# Patient Record
Sex: MALE | Race: WHITE | NOT HISPANIC OR LATINO | Employment: FULL TIME | URBAN - NONMETROPOLITAN AREA
[De-identification: names, ages, dates, MRNs, and addresses within clinical notes are randomized per-mention and may not be internally consistent; named-entity substitution may affect disease eponyms.]

---

## 2017-01-19 ENCOUNTER — APPOINTMENT (OUTPATIENT)
Dept: MEDICAL GROUP | Facility: PHYSICIAN GROUP | Age: 42
End: 2017-01-19
Payer: COMMERCIAL

## 2017-02-15 DIAGNOSIS — M54.9 TRIGGER POINT WITH BACK PAIN: ICD-10-CM

## 2017-02-15 DIAGNOSIS — M54.50 ACUTE LEFT-SIDED LOW BACK PAIN WITHOUT SCIATICA: ICD-10-CM

## 2017-02-15 NOTE — TELEPHONE ENCOUNTER
Was the patient seen in the last year in this department? Yes     Does patient have an active prescription for medications requested? Yes     Received Request Via: Patient     Called patient to reschedule his apt. From 2/23/17 to 4/28/17. Pt. Will be out of Tramadol.

## 2017-02-16 RX ORDER — TRAMADOL HYDROCHLORIDE 50 MG/1
50-100 TABLET ORAL EVERY 4 HOURS PRN
Qty: 30 TAB | Refills: 0 | Status: SHIPPED | OUTPATIENT
Start: 2017-02-16 | End: 2017-04-28 | Stop reason: SDUPTHER

## 2017-02-16 NOTE — TELEPHONE ENCOUNTER
Notify patient refill on the tramadol at the office.    Please clarify with him this is a schedule II medication, cannot be filled outside of an OV.  He needs to come  this rx and take it to his pharmacy.  It is important for him to keep the appointment with Dr Abraham.

## 2017-04-28 ENCOUNTER — OFFICE VISIT (OUTPATIENT)
Dept: MEDICAL GROUP | Facility: PHYSICIAN GROUP | Age: 42
End: 2017-04-28
Payer: COMMERCIAL

## 2017-04-28 VITALS
WEIGHT: 186 LBS | OXYGEN SATURATION: 94 % | TEMPERATURE: 98.5 F | HEART RATE: 74 BPM | BODY MASS INDEX: 26.63 KG/M2 | DIASTOLIC BLOOD PRESSURE: 78 MMHG | SYSTOLIC BLOOD PRESSURE: 130 MMHG | RESPIRATION RATE: 12 BRPM | HEIGHT: 70 IN

## 2017-04-28 DIAGNOSIS — Z79.899 CONTROLLED SUBSTANCE AGREEMENT SIGNED: ICD-10-CM

## 2017-04-28 DIAGNOSIS — M54.50 CHRONIC MIDLINE LOW BACK PAIN WITHOUT SCIATICA: ICD-10-CM

## 2017-04-28 DIAGNOSIS — G89.29 CHRONIC MIDLINE LOW BACK PAIN WITHOUT SCIATICA: ICD-10-CM

## 2017-04-28 PROCEDURE — 99213 OFFICE O/P EST LOW 20 MIN: CPT | Performed by: INTERNAL MEDICINE

## 2017-04-28 RX ORDER — DICLOFENAC SODIUM 75 MG/1
75 TABLET, DELAYED RELEASE ORAL 2 TIMES DAILY
Qty: 180 TAB | Refills: 3 | Status: SHIPPED | OUTPATIENT
Start: 2017-04-28 | End: 2018-05-06 | Stop reason: SDUPTHER

## 2017-04-28 RX ORDER — TRAMADOL HYDROCHLORIDE 50 MG/1
TABLET ORAL
Qty: 30 TAB | Refills: 0 | Status: SHIPPED | OUTPATIENT
Start: 2017-04-28 | End: 2017-04-28 | Stop reason: SDUPTHER

## 2017-04-28 RX ORDER — TRAMADOL HYDROCHLORIDE 50 MG/1
TABLET ORAL
Qty: 30 TAB | Refills: 0 | Status: SHIPPED | OUTPATIENT
Start: 2017-04-28 | End: 2017-07-05 | Stop reason: SDUPTHER

## 2017-04-28 ASSESSMENT — PAIN SCALES - GENERAL: PAINLEVEL: 3=SLIGHT PAIN

## 2017-04-28 NOTE — PROGRESS NOTES
Chief Complaint   Patient presents with   • Medication Refill     tramadol   • Follow-Up     back pain       HISTORY OF PRESENT ILLNESS: Patient is a 41 y.o. male established patient who presents today to discuss the medical issues below.    Lumbar back pain  Doing ok with still rare tramadol, he has modified lifting.  He states he has 6-10 tabs from last rx.  He is using flexaril rarely.  States not using the diclofenac using ibuprofen 200 mg 5 tabs bid as he ran out of the diclofenac.  Pain at low back no radiation, no lower extremity numbness tingling or weakness. 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    Controlled substance agreement signed  Patient has executed pain contract.  On review of the chart the incorrect code had been entered as violation of contract, however, patient has not had any violations.        Patient Active Problem List    Diagnosis Date Noted   • Controlled substance agreement signed 12/20/2016   • Anxiety 11/23/2016   • Lumbar back pain 06/10/2015       Allergies:Bee venom    Current Outpatient Prescriptions   Medication Sig Dispense Refill   • diclofenac EC (VOLTAREN) 75 MG Tablet Delayed Response Take 1 Tab by mouth 2 times a day. 180 Tab 3   • tramadol (ULTRAM) 50 MG Tab 1 bid prn pain, not more than #30 in 30 day period 30 Tab 0   • cyclobenzaprine (FLEXERIL) 10 MG Tab Take 1 Tab by mouth at bedtime as needed. 30 Tab 2   • ibuprofen (MOTRIN) 200 MG Tab Take 200 mg by mouth every 6 hours as needed.     • Diclofenac Sodium (VOLTAREN) 1 % Gel Apply 1 Units to skin as directed 2 times a day as needed. 1 Tube 0   • sulfamethoxazole-trimethoprim (BACTRIM DS) 800-160 MG tablet Take 1 Tab by mouth 2 times a day. (Patient not taking: Reported on 4/28/2017) 20 Tab 0     No current facility-administered medications for this visit.         Past Medical History   Diagnosis Date   • Lumbar back pain 6/10/2015   • Anxiety 11/23/2016   • Hangnail 12/20/2016   •  "Controlled substance agreement broken 12/20/2016       Social History   Substance Use Topics   • Smoking status: Former Smoker -- 0.25 packs/day for 15 years     Types: Cigarettes   • Smokeless tobacco: Never Used      Comment: Quit 2 years ago   • Alcohol Use: No       No family status information on file.     Family History   Problem Relation Age of Onset   • Adopted: Yes       ROS:    Respiratory: Negative for cough, sputum production, shortness of breath or wheezing.    Cardiovascular: Negative for chest pain, palpitations, orthopnea, dyspnea with exertion or edema.   Gastrointestinal: Negative for GI upset, nausea, vomiting, abdominal pain, constipation or diarrhea.   Genitourinary: Negative for dysuria, urgency, hesitancy or frequency.       Exam:    Blood pressure 130/78, pulse 74, temperature 36.9 °C (98.5 °F), resp. rate 12, height 1.778 m (5' 10\"), weight 84.369 kg (186 lb), SpO2 94 %.  General:  Well nourished, well developed male in NAD.  Spine: No vertebral or vertebral angle tenderness negative straight leg testing  Pulmonary: Clear to ausculation and percussion.  Normal effort. No rales, rhonchi, or wheezing.  Cardiovascular: Regular rate and rhythm without murmur.   Abdomen: Normal bowel sounds soft and nontender no palpable liver spleen bladder mass.  Extremities: No LE edema noted.  Neuro: Grossly nonfocal. No lower extremity weakness  Psych: Alert and oriented to person, place, and time. Appropriate mood and conversation.        This dictation was created using voice recognition software. I have made reasonable attempts to correct errors, however, errors of grammar and content may exist.          Assessment/Plan:    1. Chronic midline low back pain without sciatica  Medication refill for the tramadol. Reviewed at length. Patient states that the Voltaren as frequently as effective as the tramadol but he has been out of that for a while now. Refills on diclofenac, stretching exercises, refill on " tramadol minimal dosage. Discussed if not significantly improving with exercise will need referral back to pain specialist for management.  - diclofenac EC (VOLTAREN) 75 MG Tablet Delayed Response; Take 1 Tab by mouth 2 times a day.  Dispense: 180 Tab; Refill: 3  - tramadol (ULTRAM) 50 MG Tab; 1 bid prn pain, not more than #30 in 30 day period  Dispense: 30 Tab; Refill: 0    2. Controlled substance agreement signed  This was incorrectly documented previously as noncompliance the records are corrected..    Patient was seen for 20 minutes face to face of which more than 50% of the time was spent in counseling and coordination of care regarding the above problems.

## 2017-04-28 NOTE — MR AVS SNAPSHOT
"        Skip Bernard   2017 11:20 AM   Office Visit   MRN: [id number]    Department:  [facility]   Dept Phone:  [phone]    Description:  Male : 1975   Provider:  Jessica MAURICE M.D.           Reason for Visit     Medication Refill tramadol    Follow-Up back pain      Allergies as of 2017     Allergen Noted Reactions    Bee Venom 10/19/2012         You were diagnosed with     Acute left-sided low back pain without sciatica   [9551363]   Acute on chronic problem. Likely secondary to lifting and bending. No bony tenderness. Likely muscular. Start tramadol, prednisone, Robaxin. Follow-up PCP    Trigger point with back pain   [[id number]]   Tender, palpable trigger point of the left lumbar region which was injected with lidocaine. Follow-up with PCP. Given written instructions.    Controlled substance agreement signed   [[id number]]         Vital Signs     Blood Pressure Pulse Temperature Respirations Height Weight    130/78 mmHg 74 36.9 °C (98.5 °F) 12 1.778 m (5' 10\") 84.369 kg (186 lb)    Body Mass Index Oxygen Saturation Smoking Status             26.69 kg/m2 94% Former Smoker         Basic Information     Date Of Birth Sex Race Ethnicity Preferred Language    1975 Male White Non- English      Problem List              ICD-10-CM Priority Class Noted - Resolved    Lumbar back pain M54.5   6/10/2015 - Present    Anxiety F41.9   2016 - Present    Controlled substance agreement signed Z79.899   2016 - Present      Health Maintenance        Date Due Completion Dates    IMM DTaP/Tdap/Td Vaccine (1 - Tdap) 11/15/1996 ---            Current Immunizations     No immunizations on file.      Below and/or attached are the medications your provider expects you to take. Review all of your home medications and newly ordered medications with your provider and/or pharmacist. Follow medication instructions as directed by your provider and/or pharmacist. Please keep your medication " list with you and share with your provider. Update the information when medications are discontinued, doses are changed, or new medications (including over-the-counter products) are added; and carry medication information at all times in the event of emergency situations     Allergies:  BEE VENOM - (reactions not documented)               Medications  Valid as of: April 28, 2017 - 11:46 AM    Generic Name Brand Name Tablet Size Instructions for use    Cyclobenzaprine HCl (Tab) FLEXERIL 10 MG Take 1 Tab by mouth at bedtime as needed.        Diclofenac Sodium (Gel) Diclofenac Sodium 1 % Apply 1 Units to skin as directed 2 times a day as needed.        Diclofenac Sodium (Tablet Delayed Response) VOLTAREN 75 MG Take 1 Tab by mouth 2 times a day.        Ibuprofen (Tab) MOTRIN 200 MG Take 200 mg by mouth every 6 hours as needed.        Sulfamethoxazole-Trimethoprim (Tab) BACTRIM -160 MG Take 1 Tab by mouth 2 times a day.        TraMADol HCl (Tab) ULTRAM 50 MG 1 bid prn pain, not more than #30 in 30 day period        .                 Medicines prescribed today were sent to:     [facility] [place] - [address] AT [place] [address]    [address] [place] [address]    Phone: [phone] Fax: [phone]    Open 24 Hours?: No      Medication refill instructions:       If your prescription bottle indicates you have medication refills left, it is not necessary to call your provider’s office. Please contact your pharmacy and they will refill your medication.    If your prescription bottle indicates you do not have any refills left, you may request refills at any time through one of the following ways: The online Blockchain system (except Urgent Care), by calling your provider’s office, or by asking your pharmacy to contact your provider’s office with a refill request. Medication refills are processed only during regular business hours and may not be available until the next business day.  Your provider may request additional information or to have a follow-up visit with you prior to refilling your medication.   *Please Note: Medication refills are assigned a new Rx number when refilled electronically. Your pharmacy may indicate that no refills were authorized even though a new prescription for the same medication is available at the pharmacy. Please request the medicine by name with the pharmacy before contacting your provider for a refill.           Lyst[employer] Access Code: Activation code not generated  Current Lysthart Status: Active

## 2017-04-28 NOTE — ASSESSMENT & PLAN NOTE
Patient has executed pain contract.  On review of the chart the incorrect code had been entered as violation of contract, however, patient has not had any violations.

## 2017-04-28 NOTE — ASSESSMENT & PLAN NOTE
Doing ok with still rare tramadol, he has modified lifting.  He states he has 6-10 tabs from last rx.  He is using flexaril rarely.  States not using the diclofenac using ibuprofen 200 mg 5 tabs bid as he ran out of the diclofenac.  Pain at low back no radiation, no lower extremity numbness tingling or weakness.

## 2017-07-05 ENCOUNTER — OFFICE VISIT (OUTPATIENT)
Dept: MEDICAL GROUP | Facility: PHYSICIAN GROUP | Age: 42
End: 2017-07-05
Payer: COMMERCIAL

## 2017-07-05 VITALS
HEART RATE: 76 BPM | WEIGHT: 189 LBS | SYSTOLIC BLOOD PRESSURE: 122 MMHG | OXYGEN SATURATION: 95 % | BODY MASS INDEX: 27.99 KG/M2 | HEIGHT: 69 IN | DIASTOLIC BLOOD PRESSURE: 84 MMHG | RESPIRATION RATE: 16 BRPM | TEMPERATURE: 98.2 F

## 2017-07-05 DIAGNOSIS — F41.9 ANXIETY: ICD-10-CM

## 2017-07-05 DIAGNOSIS — M54.50 CHRONIC MIDLINE LOW BACK PAIN WITHOUT SCIATICA: ICD-10-CM

## 2017-07-05 DIAGNOSIS — G89.29 CHRONIC MIDLINE LOW BACK PAIN WITHOUT SCIATICA: ICD-10-CM

## 2017-07-05 PROCEDURE — 99213 OFFICE O/P EST LOW 20 MIN: CPT | Performed by: INTERNAL MEDICINE

## 2017-07-05 RX ORDER — BACLOFEN 10 MG/1
10 TABLET ORAL NIGHTLY PRN
Qty: 30 TAB | Refills: 1 | Status: SHIPPED | OUTPATIENT
Start: 2017-07-05 | End: 2017-09-05 | Stop reason: SDUPTHER

## 2017-07-05 RX ORDER — TRAMADOL HYDROCHLORIDE 50 MG/1
TABLET ORAL
Qty: 30 TAB | Refills: 0 | Status: SHIPPED | OUTPATIENT
Start: 2017-07-05 | End: 2018-01-12

## 2017-07-05 ASSESSMENT — PAIN SCALES - GENERAL: PAINLEVEL: 3=SLIGHT PAIN

## 2017-07-05 NOTE — PROGRESS NOTES
Chief Complaint   Patient presents with   • Medication Refill     tramadol, diclofenac gel        HISTORY OF PRESENT ILLNESS: Patient is a 41 y.o. male established patient who presents today to discuss the medical issues below.    Lumbar back pain  Using the diclofenac.  Feels it works better.  Not really following the back exercises.  He is walking and stretching.  Pain at the low back.  Has had MRI in 2015 L4-5 degenerative disc.  Pain at the low back no radiation but does involve the right and the left.  Has had radiation into the left leg but not recent with better back mechanics.  No weakness.  Has used about 30 tramadol in 3 mos.  Flexaril gave him hang over sleepy so stopped.  Not really doing the back exercises (we did not give the back book).     Anxiety  Patient develops anxiety attacks when he considers needle injections on his back.  Patient does better with some vacation.  Doing more activities as shooting.  Denies any recent anxiety attacks.      Patient Active Problem List    Diagnosis Date Noted   • Controlled substance agreement signed 12/20/2016   • Anxiety 11/23/2016   • Lumbar back pain 06/10/2015       Allergies:Bee venom    Current Outpatient Prescriptions   Medication Sig Dispense Refill   • baclofen (LIORESAL) 10 MG Tab Take 1 Tab by mouth at bedtime as needed (back pain or spasm). 30 Tab 1   • tramadol (ULTRAM) 50 MG Tab 1 bid prn pain, not more than #30 in 30 day period 30 Tab 0   • diclofenac EC (VOLTAREN) 75 MG Tablet Delayed Response Take 1 Tab by mouth 2 times a day. 180 Tab 3   • ibuprofen (MOTRIN) 200 MG Tab Take 200 mg by mouth every 6 hours as needed.     • Diclofenac Sodium (VOLTAREN) 1 % Gel Apply 1 Units to skin as directed 2 times a day as needed. 1 Tube 0     No current facility-administered medications for this visit.         Past Medical History   Diagnosis Date   • Lumbar back pain 6/10/2015   • Anxiety 11/23/2016   • Hangnail 12/20/2016   • Controlled substance agreement  "broken 12/20/2016       Social History   Substance Use Topics   • Smoking status: Former Smoker -- 0.25 packs/day for 15 years     Types: Cigarettes   • Smokeless tobacco: Never Used      Comment: Quit 2 years ago   • Alcohol Use: No       No family status information on file.     Family History   Problem Relation Age of Onset   • Adopted: Yes       ROS:    Respiratory: Negative for cough, sputum production, shortness of breath or wheezing.    Cardiovascular: Negative for chest pain, palpitations, orthopnea, dyspnea with exertion or edema.   Gastrointestinal: Negative for GI upset, nausea, vomiting, abdominal pain, constipation or diarrhea.   Genitourinary: Negative for dysuria, urgency, hesitancy or frequency.       Exam:    Blood pressure 122/84, pulse 76, temperature 36.8 °C (98.2 °F), resp. rate 16, height 1.74 m (5' 8.5\"), weight 85.73 kg (189 lb), SpO2 95 %.  General:  Well nourished, well developed male in NAD.  Spine: No vertebral or vertebral angle tenderness no SI tenderness negative straight leg testing.  Cardiovascular: Regular rate and rhythm without murmur.   Abdomen: Normal bowel sounds soft and nontender no palpable liver spleen bladder mass.  Extremities: No LE edema noted.  Neuro: Grossly nonfocal.  Psych: Alert and oriented to person, place, and time. Appropriate mood and conversation.        This dictation was created using voice recognition software. I have made reasonable attempts to correct errors, however, errors of grammar and content may exist.          Assessment/Plan:    1. Chronic midline low back pain without sciatica  At baseline. Patient declines referral for consideration of injection pain management. He is utilizing tramadol about 30 tablets in a 90 day period of time. Doing fairly well with ongoing diclofenac, no GI upset, he is not doing stretching exercises. Back booklet is given. MRI reviewed.  - tramadol (ULTRAM) 50 MG Tab; 1 bid prn pain, not more than #30 in 30 day period  " Dispense: 30 Tab; Refill: 0  Refill on tramadol gel.    2. Anxiety  States he is doing fairly well at work, behavior modification is being effective. Currently on no additional medications. Monitor support.       Patient was seen for  20 minutes face to face of which more than 50% of the time was spent in counseling and coordination of care regarding the above problems.

## 2017-07-05 NOTE — MR AVS SNAPSHOT
"        Skip Bernard   2017 7:30 AM   Office Visit   MRN: [id number]    Department:  [facility]   Dept Phone:  [phone]    Description:  Male : 1975   Provider:  Jessica MAURICE M.D.           Reason for Visit     Medication Refill tramadol, diclofenac gel       Allergies as of 2017     Allergen Noted Reactions    Bee Venom 10/19/2012         You were diagnosed with     Chronic midline low back pain without sciatica   [6845086]       Anxiety   [[id number]]         Vital Signs     Blood Pressure Pulse Temperature Respirations Height Weight    122/84 mmHg 76 36.8 °C (98.2 °F) 16 1.74 m (5' 8.5\") 85.73 kg (189 lb)    Body Mass Index Oxygen Saturation Smoking Status             28.32 kg/m2 95% Former Smoker         Basic Information     Date Of Birth Sex Race Ethnicity Preferred Language    1975 Male White Non- English      Your appointments     Sep 27, 2017  3:30 PM   Established Patient with Jessica MAURICE M.D.   [facility] (--)    [address] [facility] [address] [phone]           You will be receiving a confirmation call a few days before your appointment from our automated call confirmation system.              Problem List              ICD-10-CM Priority Class Noted - Resolved    Lumbar back pain M54.5   6/10/2015 - Present    Anxiety F41.9   2016 - Present    Controlled substance agreement signed Z79.899   2016 - Present      Health Maintenance        Date Due Completion Dates    IMM DTaP/Tdap/Td Vaccine (1 - Tdap) 11/15/1996 ---    IMM INFLUENZA (1) 2017 ---            Current Immunizations     No immunizations on file.      Below and/or attached are the medications your provider expects you to take. Review all of your home medications and newly ordered medications with your provider and/or pharmacist. Follow medication instructions as directed by your provider and/or pharmacist. Please keep your medication list " with you and share with your provider. Update the information when medications are discontinued, doses are changed, or new medications (including over-the-counter products) are added; and carry medication information at all times in the event of emergency situations     Allergies:  BEE VENOM - (reactions not documented)               Medications  Valid as of: July 05, 2017 -  7:51 AM    Generic Name Brand Name Tablet Size Instructions for use    Baclofen (Tab) LIORESAL 10 MG Take 1 Tab by mouth at bedtime as needed (back pain or spasm).        Diclofenac Sodium (Gel) Diclofenac Sodium 1 % Apply 1 Units to skin as directed 2 times a day as needed.        Diclofenac Sodium (Tablet Delayed Response) VOLTAREN 75 MG Take 1 Tab by mouth 2 times a day.        Ibuprofen (Tab) MOTRIN 200 MG Take 200 mg by mouth every 6 hours as needed.        TraMADol HCl (Tab) ULTRAM 50 MG 1 bid prn pain, not more than #30 in 30 day period        .                 Medicines prescribed today were sent to:     [facility] [address] [facility] [place] - [address] AT Pending sale to [facility]     [address] [facility] [address]    Phone: [phone] Fax: [phone]    Open 24 Hours?: No      Medication refill instructions:       If your prescription bottle indicates you have medication refills left, it is not necessary to call your provider’s office. Please contact your pharmacy and they will refill your medication.    If your prescription bottle indicates you do not have any refills left, you may request refills at any time through one of the following ways: The online [employer] system (except Urgent Care), by calling your provider’s office, or by asking your pharmacy to contact your provider’s office with a refill request. Medication refills are processed only during regular business hours and may not be available until the next business day. Your provider may request additional information or to have a follow-up visit with you prior to  refilling your medication.   *Please Note: Medication refills are assigned a new Rx number when refilled electronically. Your pharmacy may indicate that no refills were authorized even though a new prescription for the same medication is available at the pharmacy. Please request the medicine by name with the pharmacy before contacting your provider for a refill.           [employer] Access Code: Activation code not generated  Current [employer] Status: Active

## 2017-07-05 NOTE — ASSESSMENT & PLAN NOTE
Patient develops anxiety attacks when he considers needle injections on his back.  Patient does better with some vacation.  Doing more activities as shooting.  Denies any recent anxiety attacks.

## 2017-07-05 NOTE — ASSESSMENT & PLAN NOTE
Using the diclofenac.  Feels it works better.  Not really following the back exercises.  He is walking and stretching.  Pain at the low back.  Has had MRI in 2015 L4-5 degenerative disc.  Pain at the low back no radiation but does involve the right and the left.  Has had radiation into the left leg but not recent with better back mechanics.  No weakness.  Has used about 30 tramadol in 3 mos.  Flexaril gave him hang over sleepy so stopped.  Not really doing the back exercises (we did not give the back book).

## 2017-07-11 ENCOUNTER — OFFICE VISIT (OUTPATIENT)
Dept: URGENT CARE | Facility: PHYSICIAN GROUP | Age: 42
End: 2017-07-11
Payer: COMMERCIAL

## 2017-07-11 VITALS
TEMPERATURE: 98.4 F | DIASTOLIC BLOOD PRESSURE: 90 MMHG | BODY MASS INDEX: 25.48 KG/M2 | OXYGEN SATURATION: 97 % | RESPIRATION RATE: 20 BRPM | HEART RATE: 98 BPM | WEIGHT: 178 LBS | SYSTOLIC BLOOD PRESSURE: 102 MMHG | HEIGHT: 70 IN

## 2017-07-11 DIAGNOSIS — H69.93 EUSTACHIAN TUBE DYSFUNCTION, BILATERAL: ICD-10-CM

## 2017-07-11 DIAGNOSIS — F41.9 ANXIETY: ICD-10-CM

## 2017-07-11 PROCEDURE — 99214 OFFICE O/P EST MOD 30 MIN: CPT | Performed by: PHYSICIAN ASSISTANT

## 2017-07-11 RX ORDER — HYDROXYZINE HYDROCHLORIDE 25 MG/1
25 TABLET, FILM COATED ORAL 3 TIMES DAILY PRN
Qty: 30 TAB | Refills: 0 | Status: SHIPPED | OUTPATIENT
Start: 2017-07-11 | End: 2017-09-27 | Stop reason: SDUPTHER

## 2017-07-11 NOTE — MR AVS SNAPSHOT
"        Skip Marco Antonio   2017 10:55 AM   Office Visit   MRN: [id number]    Department:  [facility]   Dept Phone:  [phone]    Description:  Male : 1975   Provider:  Juan Espitia PA-C           Reason for Visit     Anxiety anxiety attack x1day ago tired, cant sleep, no appetite    Otalgia bilateral ear pain since this morning       Allergies as of 2017     Allergen Noted Reactions    Bee Venom 10/19/2012         You were diagnosed with     Anxiety   [699300]       Eustachian tube dysfunction, bilateral   [7491987]         Vital Signs     Blood Pressure Pulse Temperature Respirations Height Weight    102/90 mmHg 98 36.9 °C (98.4 °F) 20 1.778 m (5' 10\") 80.74 kg (178 lb)    Body Mass Index Oxygen Saturation Smoking Status             25.54 kg/m2 97% Former Smoker         Basic Information     Date Of Birth Sex Race Ethnicity Preferred Language    1975 Male White Non- English      Your appointments     Sep 27, 2017  3:30 PM   Established Patient with Jessica MAURICE M.D.   [facility] (--)    [address] [id number] [phone]           You will be receiving a confirmation call a few days before your appointment from our automated call confirmation system.              Problem List              ICD-10-CM Priority Class Noted - Resolved    Lumbar back pain M54.5   6/10/2015 - Present    Anxiety F41.9   2016 - Present    Controlled substance agreement signed Z79.899   2016 - Present      Health Maintenance        Date Due Completion Dates    IMM DTaP/Tdap/Td Vaccine (1 - Tdap) 11/15/1996 ---    IMM INFLUENZA (1) 2017 ---            Current Immunizations     No immunizations on file.      Below and/or attached are the medications your provider expects you to take. Review all of your home medications and newly ordered medications with your provider and/or pharmacist. Follow medication instructions as directed by your " provider and/or pharmacist. Please keep your medication list with you and share with your provider. Update the information when medications are discontinued, doses are changed, or new medications (including over-the-counter products) are added; and carry medication information at all times in the event of emergency situations     Allergies:  BEE VENOM - (reactions not documented)               Medications  Valid as of: July 11, 2017 - 11:42 AM    Generic Name Brand Name Tablet Size Instructions for use    Baclofen (Tab) LIORESAL 10 MG Take 1 Tab by mouth at bedtime as needed (back pain or spasm).        Diclofenac Sodium (Tablet Delayed Response) VOLTAREN 75 MG Take 1 Tab by mouth 2 times a day.        Diclofenac Sodium (Gel) Diclofenac Sodium 1 % Apply 1 Units to skin as directed 2 times a day as needed.        HydrOXYzine HCl (Tab) ATARAX 25 MG Take 1 Tab by mouth 3 times a day as needed for Itching.        TraMADol HCl (Tab) ULTRAM 50 MG 1 bid prn pain, not more than #30 in 30 day period        .                 Medicines prescribed today were sent to:     [id number] - [facility] [place] - [address] AT [place] [address]    [address] [place] [address]    Phone: [phone] Fax: [phone]    Open 24 Hours?: No      Medication refill instructions:       If your prescription bottle indicates you have medication refills left, it is not necessary to call your provider’s office. Please contact your pharmacy and they will refill your medication.    If your prescription bottle indicates you do not have any refills left, you may request refills at any time through one of the following ways: The online FRAMED system (except Urgent Care), by calling your provider’s office, or by asking your pharmacy to contact your provider’s office with a refill request. Medication refills are processed only during regular business hours and may not be available until the next business day. Your provider may  request additional information or to have a follow-up visit with you prior to refilling your medication.   *Please Note: Medication refills are assigned a new Rx number when refilled electronically. Your pharmacy may indicate that no refills were authorized even though a new prescription for the same medication is available at the pharmacy. Please request the medicine by name with the pharmacy before contacting your provider for a refill.           SafetyCertifiedhart Access Code: Activation code not generated  Current Xapot Status: Active
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
## 2017-09-27 ENCOUNTER — OFFICE VISIT (OUTPATIENT)
Dept: MEDICAL GROUP | Facility: PHYSICIAN GROUP | Age: 42
End: 2017-09-27
Payer: COMMERCIAL

## 2017-09-27 VITALS
DIASTOLIC BLOOD PRESSURE: 72 MMHG | WEIGHT: 177 LBS | SYSTOLIC BLOOD PRESSURE: 120 MMHG | HEART RATE: 87 BPM | OXYGEN SATURATION: 95 % | HEIGHT: 69 IN | RESPIRATION RATE: 16 BRPM | TEMPERATURE: 98.5 F | BODY MASS INDEX: 26.22 KG/M2

## 2017-09-27 DIAGNOSIS — F41.9 ANXIETY: ICD-10-CM

## 2017-09-27 DIAGNOSIS — G89.29 CHRONIC MIDLINE LOW BACK PAIN WITHOUT SCIATICA: ICD-10-CM

## 2017-09-27 DIAGNOSIS — M54.50 CHRONIC MIDLINE LOW BACK PAIN WITHOUT SCIATICA: ICD-10-CM

## 2017-09-27 PROCEDURE — 99214 OFFICE O/P EST MOD 30 MIN: CPT | Performed by: INTERNAL MEDICINE

## 2017-09-27 RX ORDER — VENLAFAXINE HYDROCHLORIDE 37.5 MG/1
37.5 CAPSULE, EXTENDED RELEASE ORAL DAILY
Qty: 30 CAP | Refills: 3 | Status: SHIPPED | OUTPATIENT
Start: 2017-09-27 | End: 2018-01-12

## 2017-09-27 RX ORDER — HYDROXYZINE HYDROCHLORIDE 25 MG/1
25 TABLET, FILM COATED ORAL NIGHTLY PRN
Qty: 30 TAB | Refills: 2 | Status: SHIPPED | OUTPATIENT
Start: 2017-09-27 | End: 2018-01-12 | Stop reason: SDUPTHER

## 2017-09-27 ASSESSMENT — PAIN SCALES - GENERAL: PAINLEVEL: 5=MODERATE PAIN
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
## 2017-09-27 NOTE — ASSESSMENT & PLAN NOTE
Patient reports he did not get into the [facility] [place] due to work schedule.  He reports he is using he has 10 left over from last rx.  Not doing regular stretching or back exercises.  He is using the voltaren.  Pain is always present 2-3/10 at the best, 10/10 when bad, low back on the right with some some radiation into both legs.  Better if stretches to the right.  Taking the diclofenac only prn, cream better.  He doesn't feel the diclfenac significantly changes the pain.  Patient admits that some norco for a dental extraction really helped the back.
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
## 2018-01-12 ENCOUNTER — OFFICE VISIT (OUTPATIENT)
Dept: MEDICAL GROUP | Facility: PHYSICIAN GROUP | Age: 43
End: 2018-01-12
Payer: COMMERCIAL

## 2018-01-12 VITALS
TEMPERATURE: 98.9 F | BODY MASS INDEX: 27.11 KG/M2 | HEART RATE: 86 BPM | OXYGEN SATURATION: 95 % | SYSTOLIC BLOOD PRESSURE: 110 MMHG | HEIGHT: 69 IN | DIASTOLIC BLOOD PRESSURE: 78 MMHG | RESPIRATION RATE: 16 BRPM | WEIGHT: 183 LBS

## 2018-01-12 DIAGNOSIS — F41.9 ANXIETY: ICD-10-CM

## 2018-01-12 DIAGNOSIS — M54.50 CHRONIC MIDLINE LOW BACK PAIN WITHOUT SCIATICA: ICD-10-CM

## 2018-01-12 DIAGNOSIS — G89.29 CHRONIC MIDLINE LOW BACK PAIN WITHOUT SCIATICA: ICD-10-CM

## 2018-01-12 PROCEDURE — 99214 OFFICE O/P EST MOD 30 MIN: CPT | Performed by: INTERNAL MEDICINE

## 2018-01-12 RX ORDER — HYDROXYZINE HYDROCHLORIDE 25 MG/1
25 TABLET, FILM COATED ORAL 2 TIMES DAILY PRN
Qty: 30 TAB | Refills: 3 | Status: SHIPPED | OUTPATIENT
Start: 2018-01-12 | End: 2018-04-13 | Stop reason: SDUPTHER

## 2018-01-12 RX ORDER — ESCITALOPRAM OXALATE 10 MG/1
10 TABLET ORAL DAILY
Qty: 30 TAB | Refills: 3 | Status: SHIPPED | OUTPATIENT
Start: 2018-01-12 | End: 2018-04-13

## 2018-01-12 ASSESSMENT — PATIENT HEALTH QUESTIONNAIRE - PHQ9: CLINICAL INTERPRETATION OF PHQ2 SCORE: 0

## 2018-01-13 NOTE — ASSESSMENT & PLAN NOTE
Patient reports he is now riding bikes, avoiding rock climbing and 4 wheelers.  Uses the diclofenac regularly and not using the tramadol at all.

## 2018-01-13 NOTE — ASSESSMENT & PLAN NOTE
Patient states the venlafaxine began to make him feel withdrawn from his family, the hydroxyzine works well.

## 2018-01-13 NOTE — PROGRESS NOTES
Chief Complaint   Patient presents with   • Anxiety     venlafaxine FV       HISTORY OF PRESENT ILLNESS: Patient is a 42 y.o. male established patient who presents today to discuss the medical issues below.    Lumbar back pain  Patient reports he is now riding bikes, avoiding rock climbing and 4 wheelers.  Uses the diclofenac regularly and not using the tramadol at all.      Anxiety  Patient states the venlafaxine began to make him feel withdrawn from his family, the hydroxyzine works well.        Patient Active Problem List    Diagnosis Date Noted   • Controlled substance agreement signed 12/20/2016   • Anxiety 11/23/2016   • Lumbar back pain 06/10/2015       Allergies:Bee venom    Current Outpatient Prescriptions   Medication Sig Dispense Refill   • escitalopram (LEXAPRO) 10 MG Tab Take 1 Tab by mouth every day. 30 Tab 3   • diclofenac (SOLARAZE) 3 % gel Apply small amount to affected area tid prn 50 g 3   • hydrOXYzine HCl (ATARAX) 25 MG Tab Take 1 Tab by mouth 2 times a day as needed for Itching or Anxiety for up to 30 days. 30 Tab 3   • Diclofenac Sodium (VOLTAREN) 1 % Gel Apply 1 Units to skin as directed 2 times a day as needed. 1 Tube 1   • diclofenac EC (VOLTAREN) 75 MG Tablet Delayed Response Take 1 Tab by mouth 2 times a day. 180 Tab 3     No current facility-administered medications for this visit.          Past Medical History:   Diagnosis Date   • Anxiety 11/23/2016   • Controlled substance agreement broken 12/20/2016   • Hangnail 12/20/2016   • Lumbar back pain 6/10/2015       Social History   Substance Use Topics   • Smoking status: Former Smoker     Packs/day: 0.25     Years: 15.00     Types: Cigarettes   • Smokeless tobacco: Never Used      Comment: Quit 2 years ago   • Alcohol use Yes       No family status information on file.     Family History   Problem Relation Age of Onset   • Adopted: Yes       ROS:    Respiratory: Negative for cough, sputum production, shortness of breath or wheezing.   "  Cardiovascular: Negative for chest pain, palpitations, orthopnea, dyspnea with exertion or edema.   Gastrointestinal: Negative for GI upset, nausea, vomiting, abdominal pain, constipation or diarrhea.   Genitourinary: Negative for dysuria, urgency, hesitancy or frequency.       Exam:    Blood pressure 110/78, pulse 86, temperature 37.2 °C (98.9 °F), resp. rate 16, height 1.74 m (5' 8.5\"), weight 83 kg (183 lb), SpO2 95 %.  General:  Well nourished, well developed male in NAD.  Pulmonary: Clear to ausculation and percussion.  Normal effort. No rales, rhonchi, or wheezing.  Cardiovascular: Regular rate and rhythm without murmur.   Abdomen: Normal bowel sounds soft and nontender no palpable liver spleen bladder mass.  Extremities: No LE edema noted.  Neuro: Grossly nonfocal.  Psych: Alert and oriented to person, place, and time. Appropriate mood and conversation.        This dictation was created using voice recognition software. I have made reasonable attempts to correct errors, however, errors of grammar and content may exist.          Assessment/Plan:    1. Chronic midline low back pain without sciatica  Doing very well with regular physical exercise would like to try the diclofenac gel instead of the tablets prescription written    2. Anxiety  Doing remarkably well with just when necessary hydroxyzine he still pretty wild up in terms of workplace and home especially if he has to drive into [place]. Long discussion held support given trial of Lexapro low-dose 10 L one daily. Discussed the hydroxyzine 1-2 tablets a day when necessary anxiety.  - escitalopram (LEXAPRO) 10 MG Tab; Take 1 Tab by mouth every day.  Dispense: 30 Tab; Refill: 3  - hydrOXYzine HCl (ATARAX) 25 MG Tab; Take 1 Tab by mouth 2 times a day as needed for Itching or Anxiety for up to 30 days.  Dispense: 30 Tab; Refill: 3    Patient was seen for  25 minutes face to face of which more than 50% of the time was spent in counseling and coordination of " care regarding the above problems.

## 2018-01-17 ENCOUNTER — TELEPHONE (OUTPATIENT)
Dept: MEDICAL GROUP | Facility: PHYSICIAN GROUP | Age: 43
End: 2018-01-17

## 2018-01-17 NOTE — TELEPHONE ENCOUNTER
The Diclofenac Gel required a Prior Auth and it was denied.  The Denial Letter is scanned into media.  Is there something else that the patient can try?  Please advise

## 2018-01-18 NOTE — TELEPHONE ENCOUNTER
Notify the patient that insurance said no on the gel, he will need to stick with the diclofenac PO>

## 2018-01-19 ENCOUNTER — TELEPHONE (OUTPATIENT)
Dept: MEDICAL GROUP | Facility: PHYSICIAN GROUP | Age: 43
End: 2018-01-19

## 2018-01-19 NOTE — TELEPHONE ENCOUNTER
Pt was here for an OV on 1/12/18.  After  left the room the patient came out and said that he had to leave right away because he had another appointment that he was late for.  I told him that he still needed to sign the controlled substance agreement and he needed to leave a urine sample for a millennium drug screen.  He proceeded to tell me that he had to leave now and that he had been waiting for someone for an hour.  so I had him sign the controlled consent form and I said that he needed to come back on Monday to leave a urine sample, he said that he would.   The patient did not come and leave a millennium on Monday so I called him on Wednesday and LVM with him stating that he needed to leave a millennium so that  could keep prescribing his medications.  I have not heard back from the patient.

## 2018-02-03 ENCOUNTER — OFFICE VISIT (OUTPATIENT)
Dept: URGENT CARE | Facility: PHYSICIAN GROUP | Age: 43
End: 2018-02-03
Payer: COMMERCIAL

## 2018-02-03 VITALS
DIASTOLIC BLOOD PRESSURE: 88 MMHG | RESPIRATION RATE: 16 BRPM | SYSTOLIC BLOOD PRESSURE: 132 MMHG | OXYGEN SATURATION: 95 % | BODY MASS INDEX: 26.34 KG/M2 | WEIGHT: 184 LBS | HEIGHT: 70 IN | TEMPERATURE: 100 F | HEART RATE: 101 BPM

## 2018-02-03 DIAGNOSIS — J02.9 ACUTE PHARYNGITIS, UNSPECIFIED ETIOLOGY: ICD-10-CM

## 2018-02-03 DIAGNOSIS — R21 RASH: ICD-10-CM

## 2018-02-03 PROCEDURE — 99214 OFFICE O/P EST MOD 30 MIN: CPT | Performed by: FAMILY MEDICINE

## 2018-02-03 RX ORDER — AMOXICILLIN 875 MG/1
TABLET, COATED ORAL
Qty: 20 TAB | Refills: 0 | Status: SHIPPED | OUTPATIENT
Start: 2018-02-03 | End: 2020-03-03

## 2018-02-03 NOTE — PROGRESS NOTES
Chief Complaint:    Chief Complaint   Patient presents with   • Pharyngitis     body aches, itching all over       History of Present Illness:    This is a new problem. For 3 days, he has had subjective fever, body aches, and sore throat (L>R). Started with some itchy rash on arms/hands this AM. Amoxil has worked/tolerated for similar previous symptoms.      Review of Systems:    Constitutional: See HPI.  Eyes: Negative for change in vision, photophobia, pain, redness, and discharge.  ENT: See HPI.  Respiratory: Negative for cough, hemoptysis, sputum production, shortness of breath, wheezing, and stridor.    Cardiovascular: Negative for chest pain, palpitations, orthopnea, claudication, leg swelling, and PND.   Gastrointestinal: Negative for abdominal pain, nausea, vomiting, diarrhea, constipation, blood in stool, and melena.   Genitourinary: Negative for dysuria, urinary urgency, urinary frequency, hematuria, and flank pain.   Musculoskeletal: See HPI.   Skin: See HPI.   Neurological: Negative for dizziness, tingling, tremors, sensory change, speech change, focal weakness, seizures, loss of consciousness, and headaches.   Endo: Negative for polydipsia.   Heme: Does not bruise/bleed easily.   Psychiatric/Behavioral: No new symptoms.      Past Medical History:    Past Medical History:   Diagnosis Date   • Anxiety 11/23/2016   • Controlled substance agreement broken 12/20/2016   • Hangnail 12/20/2016   • Lumbar back pain 6/10/2015       Past Surgical History:    History reviewed. No pertinent surgical history.    Social History:    Social History     Social History   • Marital status: Unknown     Spouse name: N/A   • Number of children: N/A   • Years of education: N/A     Occupational History   • Not on file.     Social History Main Topics   • Smoking status: Former Smoker     Packs/day: 0.25     Years: 15.00     Types: Cigarettes   • Smokeless tobacco: Never Used      Comment: Quit 2 years ago   • Alcohol use Yes   •  "Drug use: No   • Sexual activity: Yes     Partners: Female     Other Topics Concern   • Not on file     Social History Narrative   • No narrative on file       Family History:    Family History   Problem Relation Age of Onset   • Adopted: Yes       Medications:    Current Outpatient Prescriptions on File Prior to Visit   Medication Sig Dispense Refill   • escitalopram (LEXAPRO) 10 MG Tab Take 1 Tab by mouth every day. 30 Tab 3   • diclofenac (SOLARAZE) 3 % gel Apply small amount to affected area tid prn 50 g 3   • hydrOXYzine HCl (ATARAX) 25 MG Tab Take 1 Tab by mouth 2 times a day as needed for Itching or Anxiety for up to 30 days. 30 Tab 3   • Diclofenac Sodium (VOLTAREN) 1 % Gel Apply 1 Units to skin as directed 2 times a day as needed. 1 Tube 1   • diclofenac EC (VOLTAREN) 75 MG Tablet Delayed Response Take 1 Tab by mouth 2 times a day. 180 Tab 3     No current facility-administered medications on file prior to visit.        Allergies:    Allergies   Allergen Reactions   • Bee Venom        Vitals:    Vitals:    02/03/18 0918   BP: 132/88   Pulse: (!) 101   Resp: 16   Temp: 37.8 °C (100 °F)   SpO2: 95%   Weight: 83.5 kg (184 lb)   Height: 1.778 m (5' 10\")       Physical Exam:    Constitutional: Vital signs reviewed. Appears well-developed and well-nourished. No acute distress.   Eyes: Sclera white, conjunctivae clear.   ENT: External ears normal. Hearing normal. Nasal mucosa pink. Lips/teeth are normal. Oral mucosa pink and moist. Posterior pharynx: moderately erythematous and swollen bilaterally without exudate.  Neck: Neck supple.   Cardiovascular: Regular rate and rhythm. No murmur.  Pulmonary/Chest: Respirations non-labored. Clear to auscultation bilaterally.  Lymph: Cervical nodes without tenderness or enlargement.  Musculoskeletal: Normal gait. Normal range of motion. No muscular atrophy or weakness.  Neurological: Alert and oriented to person, place, and time. Muscle tone normal. Coordination normal. "   Skin: Erythematous urticarial patches on bilateral forearms/hands.  Psychiatric: Normal mood and affect. Behavior is normal. Judgment and thought content normal.       Assessment / Plan:    1. Acute pharyngitis, unspecified etiology  - amoxicillin (AMOXIL) 875 MG tablet; 1 TAB TWICE A DAY X 10 DAYS.  Dispense: 20 Tab; Refill: 0    2. Rash  - fluocinonide (LIDEX) 0.05 % Cream; APPLY THIN FILM TO RASH UP TO 3 TIMES A DAY ONLY IF NEEDED.  Dispense: 30 g; Refill: 2      Discussed with him DDX and management options.    Rash looks like allergic type of etiology.    He has Hydroxyzine that he takes prn for anxiety and advised he may also take this prn itchy rash.    Agreeable to medications prescribed.    Follow-up with PCP or urgent care if getting worse or not better with above.

## 2018-04-13 ENCOUNTER — OFFICE VISIT (OUTPATIENT)
Dept: MEDICAL GROUP | Facility: PHYSICIAN GROUP | Age: 43
End: 2018-04-13
Payer: COMMERCIAL

## 2018-04-13 VITALS
WEIGHT: 185.2 LBS | BODY MASS INDEX: 26.51 KG/M2 | RESPIRATION RATE: 18 BRPM | OXYGEN SATURATION: 96 % | HEIGHT: 70 IN | SYSTOLIC BLOOD PRESSURE: 122 MMHG | TEMPERATURE: 98.8 F | HEART RATE: 88 BPM | DIASTOLIC BLOOD PRESSURE: 78 MMHG

## 2018-04-13 DIAGNOSIS — G89.29 CHRONIC MIDLINE LOW BACK PAIN WITHOUT SCIATICA: ICD-10-CM

## 2018-04-13 DIAGNOSIS — M54.50 CHRONIC MIDLINE LOW BACK PAIN WITHOUT SCIATICA: ICD-10-CM

## 2018-04-13 DIAGNOSIS — R21 RASH AND NONSPECIFIC SKIN ERUPTION: ICD-10-CM

## 2018-04-13 DIAGNOSIS — F41.9 ANXIETY: ICD-10-CM

## 2018-04-13 PROCEDURE — 99214 OFFICE O/P EST MOD 30 MIN: CPT | Performed by: INTERNAL MEDICINE

## 2018-04-13 RX ORDER — HYDROXYZINE HYDROCHLORIDE 25 MG/1
25 TABLET, FILM COATED ORAL 2 TIMES DAILY PRN
Qty: 60 TAB | Refills: 5 | Status: SHIPPED | OUTPATIENT
Start: 2018-04-13 | End: 2018-05-13

## 2018-04-13 RX ORDER — IBUPROFEN 800 MG/1
800 TABLET ORAL EVERY 8 HOURS PRN
Qty: 90 TAB | Refills: 1 | Status: SHIPPED | OUTPATIENT
Start: 2018-04-13 | End: 2018-05-08

## 2018-04-13 NOTE — PROGRESS NOTES
Chief Complaint   Patient presents with   • Back Pain     follow up on chronic low back pain   • Anxiety   • Rash     on r hand       HISTORY OF PRESENT ILLNESS: Patient is a 42 y.o. male established patient who presents today to discuss the medical issues below.    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 He is trying to use good hand protection, moisturizing lotions and feels that an occasional when necessary steroid cream is working well.    Lumbar back pain  Up and down, generally better with more core exercise.  Using the diclofenac 1-2 a week, mostly using OTC nsaids.  Not using the escitalopram.      Anxiety  Doing really well with the hydroxyzine, no longer taking the lexapro, didn't feel that was helpful.  Venlafaxine made the patient too detatched per wife.        Patient Active Problem List    Diagnosis Date Noted   • Rash and nonspecific skin eruption 04/13/2018   • Anxiety 11/23/2016   • Lumbar back pain 06/10/2015       Allergies:Bee venom    Current Outpatient Prescriptions   Medication Sig Dispense Refill   • ibuprofen (MOTRIN) 800 MG Tab Take 1 Tab by mouth every 8 hours as needed for Moderate Pain. 90 Tab 1   • hydrOXYzine HCl (ATARAX) 25 MG Tab Take 1 Tab by mouth 2 times a day as needed for Itching or Anxiety for up to 30 days. 60 Tab 5   • diclofenac EC (VOLTAREN) 75 MG Tablet Delayed Response Take 1 Tab by mouth 2 times a day. 180 Tab 3   • amoxicillin (AMOXIL) 875 MG tablet 1 TAB TWICE A DAY X 10 DAYS. 20 Tab 0   • fluocinonide (LIDEX) 0.05 % Cream APPLY THIN FILM TO RASH UP TO 3 TIMES A DAY ONLY IF NEEDED. 30 g 2     No current facility-administered medications for this visit.          Past Medical History:   Diagnosis Date   • Anxiety 11/23/2016   • Controlled substance agreement broken 12/20/2016   • Hangnail 12/20/2016   • Lumbar back pain 6/10/2015  "      Social History   Substance Use Topics   • Smoking status: Former Smoker     Packs/day: 0.25     Years: 15.00     Types: Cigarettes   • Smokeless tobacco: Never Used      Comment: Quit 2 years ago   • Alcohol use Yes      Comment: rare       No family status information on file.     Family History   Problem Relation Age of Onset   • Adopted: Yes       ROS:    Respiratory: Negative for cough, sputum production, shortness of breath or wheezing.    Cardiovascular: Negative for chest pain, palpitations, orthopnea, dyspnea with exertion or edema.   Gastrointestinal: Negative for GI upset, nausea, vomiting, abdominal pain, constipation or diarrhea.   Genitourinary: Negative for dysuria, urgency, hesitancy or frequency.       Exam:    Blood pressure 122/78, pulse 88, temperature 37.1 °C (98.8 °F), resp. rate 18, height 1.778 m (5' 10\"), weight 84 kg (185 lb 3.2 oz), SpO2 96 %.  General:  Well nourished, well developed male in NAD.  HENT: Normocephalic, bilateral TMs are intact, nasal and oral mucosa with no lesions,   Neck: Supple without bruit. Thyroid is not enlarged.  Pulmonary: Clear to ausculation and percussion.  Normal effort. No rales, rhonchi, or wheezing.  Cardiovascular: Regular rate and rhythm without murmur.   Abdomen: Normal bowel sounds soft and nontender no palpable liver spleen bladder mass.  Extremities: No LE edema noted.  Neuro: Grossly nonfocal.  Psych: Alert and oriented to person, place, and time. Appropriate mood and conversation.        This dictation was created using voice recognition software. I have made reasonable attempts to correct errors, however, errors of grammar and content may exist.          Assessment/Plan:    1. Rash and nonspecific skin eruption  Minimal lesions most likely exposure to welding caustics as well as slag    2. Chronic midline low back pain without sciatica  Patient is maintaining with good back mechanics occasional anti-inflammatory. Discussed our options at this " point we'll switch over to the ibuprofen 800 mg prescriptions one tablet every 8 hours when necessary moderate to severe pain discussed GI monitoring and administer with foods.    3. Anxiety  Patient is doing very well with when necessary Atarax. He is not utilizing it more than once or twice a day and frequently will go several days without needing to utilize it. He finds this the best combination and would like to monitor with this with no long-acting anxiolytic antidepressants for now.  - hydrOXYzine HCl (ATARAX) 25 MG Tab; Take 1 Tab by mouth 2 times a day as needed for Itching or Anxiety for up to 30 days.  Dispense: 60 Tab; Refill: 5    Patient was seen for  25 minutes face to face of which more than 50% of the time was spent in counseling and coordination of care regarding the above problems.

## 2018-04-13 NOTE — ASSESSMENT & PLAN NOTE
Up and down, generally better with more core exercise.  Using the diclofenac 1-2 a week, mostly using OTC nsaids.  Not using the escitalopram.

## 2018-04-13 NOTE — ASSESSMENT & PLAN NOTE
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

## 2018-04-13 NOTE — ASSESSMENT & PLAN NOTE
Doing really well with the hydroxyzine, no longer taking the lexapro, didn't feel that was helpful.  Venlafaxine made the patient too detatched per wife.

## 2018-05-06 DIAGNOSIS — G89.29 CHRONIC MIDLINE LOW BACK PAIN WITHOUT SCIATICA: ICD-10-CM

## 2018-05-06 DIAGNOSIS — M54.50 CHRONIC MIDLINE LOW BACK PAIN WITHOUT SCIATICA: ICD-10-CM

## 2018-05-08 RX ORDER — DICLOFENAC SODIUM 75 MG/1
TABLET, DELAYED RELEASE ORAL
Qty: 180 TAB | Refills: 0 | Status: SHIPPED | OUTPATIENT
Start: 2018-05-08 | End: 2020-03-03

## 2018-07-02 ENCOUNTER — OFFICE VISIT (OUTPATIENT)
Dept: URGENT CARE | Facility: PHYSICIAN GROUP | Age: 43
End: 2018-07-02
Payer: COMMERCIAL

## 2018-07-02 VITALS
HEIGHT: 70 IN | RESPIRATION RATE: 16 BRPM | BODY MASS INDEX: 26.05 KG/M2 | OXYGEN SATURATION: 94 % | DIASTOLIC BLOOD PRESSURE: 70 MMHG | HEART RATE: 100 BPM | SYSTOLIC BLOOD PRESSURE: 140 MMHG | WEIGHT: 182 LBS | TEMPERATURE: 97.3 F
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
## 2018-07-02 PROCEDURE — 99214 OFFICE O/P EST MOD 30 MIN: CPT | Performed by: PHYSICIAN ASSISTANT

## 2018-07-02 RX ORDER — HYDROXYZINE HYDROCHLORIDE 25 MG/1
25 TABLET, FILM COATED ORAL 3 TIMES DAILY PRN
Qty: 30 TAB | Refills: 0 | Status: SHIPPED | OUTPATIENT
Start: 2018-07-02 | End: 2020-11-24

## 2018-07-02 NOTE — PROGRESS NOTES
Chief Complaint   Patient presents with   • Rash       HISTORY OF PRESENT ILLNESS: Patient is a 42 y.o. male who presents today because he has been outside quite a bit and has some bug bites on his arms, torso.  There are small red itchy bumps scattered sparsely amongst his upper extremities and torso.  These just started in the last couple days.  He has tried some Caladryl which did not help    Patient Active Problem List    Diagnosis Date Noted   • Rash and nonspecific skin eruption 04/13/2018   • Anxiety 11/23/2016   • Lumbar back pain 06/10/2015       Allergies:Bee venom    Current Outpatient Prescriptions Ordered in [facility]   Medication Sig Dispense Refill   • HYDROXYZINE HCL PO Take  by mouth.     • hydrOXYzine HCl (ATARAX) 25 MG Tab Take 1 Tab by mouth 3 times a day as needed. 30 Tab 0   • diclofenac EC (VOLTAREN) 75 MG Tablet Delayed Response TAKE 1 TABLET BY MOUTH TWICE DAILY 180 Tab 0   • amoxicillin (AMOXIL) 875 MG tablet 1 TAB TWICE A DAY X 10 DAYS. 20 Tab 0   • fluocinonide (LIDEX) 0.05 % Cream APPLY THIN FILM TO RASH UP TO 3 TIMES A DAY ONLY IF NEEDED. 30 g 2     No current Epic-ordered facility-administered medications on file.        Past Medical History:   Diagnosis Date   • Anxiety 11/23/2016   • Controlled substance agreement broken 12/20/2016   • Hangnail 12/20/2016   • Lumbar back pain 6/10/2015       Social History   Substance Use Topics   • Smoking status: Former Smoker     Packs/day: 0.25     Years: 15.00     Types: Cigarettes   • Smokeless tobacco: Never Used      Comment: Quit 2 years ago   • Alcohol use Yes      Comment: rare       No family status information on file.     Family History   Problem Relation Age of Onset   • Adopted: Yes       ROS:  Review of Systems   Constitutional: Negative for fever, chills, weight loss and malaise/fatigue.   HENT: Negative for ear pain, nosebleeds, congestion, sore throat and neck pain.    Eyes: Negative for blurred vision.   Respiratory: Negative for  "cough, sputum production, shortness of breath and wheezing.    Cardiovascular: Negative for chest pain, palpitations, orthopnea and leg swelling.   Gastrointestinal: Negative for heartburn, nausea, vomiting and abdominal pain.   Genitourinary: Negative for dysuria, urgency and frequency.     Exam:  Blood pressure 140/70, pulse 100, temperature 36.3 °C (97.3 °F), resp. rate 16, height 1.778 m (5' 10\"), weight 82.6 kg (182 lb), SpO2 94 %.  General:  Well nourished, well developed male in NAD  Head:Normocephalic, atraumatic  Eyes: PERRLA, EOM within normal limits, no conjunctival injection, no scleral icterus, visual fields and acuity grossly intact.  Extremities: no clubbing, cyanosis, or edema.  Skin: Various areas on his upper extremities of 0.5 cm diameter areas of erythematous papular rash    Please note that this dictation was created using voice recognition software. I have made every reasonable attempt to correct obvious errors, but I expect that there are errors of grammar and possibly content that I did not discover before finalizing the note.    Assessment/Plan:  1. Bug bite, initial encounter  hydrOXYzine HCl (ATARAX) 25 MG Tab       Followup with primary care in the next 7-10 days if not significantly improving, return to the urgent care or go to the emergency room sooner for any worsening of symptoms.       "

## 2019-02-23 ENCOUNTER — OFFICE VISIT (OUTPATIENT)
Dept: URGENT CARE | Facility: PHYSICIAN GROUP | Age: 44
End: 2019-02-23
Payer: COMMERCIAL

## 2019-02-23 VITALS
RESPIRATION RATE: 16 BRPM | TEMPERATURE: 99.1 F | SYSTOLIC BLOOD PRESSURE: 124 MMHG | HEART RATE: 112 BPM | DIASTOLIC BLOOD PRESSURE: 86 MMHG | BODY MASS INDEX: 26.63 KG/M2 | HEIGHT: 70 IN | OXYGEN SATURATION: 94 % | WEIGHT: 186 LBS

## 2019-02-23 DIAGNOSIS — J40 BRONCHITIS: ICD-10-CM

## 2019-02-23 DIAGNOSIS — R05.9 COUGH: ICD-10-CM

## 2019-02-23 PROCEDURE — 99214 OFFICE O/P EST MOD 30 MIN: CPT | Performed by: PHYSICIAN ASSISTANT

## 2019-02-23 RX ORDER — AZITHROMYCIN 250 MG/1
TABLET, FILM COATED ORAL
Qty: 6 TAB | Refills: 0 | Status: SHIPPED | OUTPATIENT
Start: 2019-02-23 | End: 2020-03-03

## 2019-02-23 RX ORDER — METHYLPREDNISOLONE 4 MG/1
4 TABLET ORAL SEE ADMIN INSTRUCTIONS
Qty: 21 TAB | Refills: 0 | Status: SHIPPED | OUTPATIENT
Start: 2019-02-23 | End: 2020-03-03

## 2019-02-23 RX ORDER — DEXTROMETHORPHAN HYDROBROMIDE AND PROMETHAZINE HYDROCHLORIDE 15; 6.25 MG/5ML; MG/5ML
5 SYRUP ORAL EVERY 4 HOURS PRN
Qty: 120 ML | Refills: 0 | Status: SHIPPED | OUTPATIENT
Start: 2019-02-23 | End: 2020-03-03

## 2019-02-23 RX ORDER — ALBUTEROL SULFATE 90 UG/1
2 AEROSOL, METERED RESPIRATORY (INHALATION) EVERY 4 HOURS PRN
Qty: 1 INHALER | Refills: 0 | Status: SHIPPED | OUTPATIENT
Start: 2019-02-23 | End: 2020-03-03

## 2019-02-23 NOTE — PROGRESS NOTES
Chief Complaint   Patient presents with   • Cough       HISTORY OF PRESENT ILLNESS: Patient is a 43 y.o. male who presents today because he has a one-week history of illness that started with 2 days of sore throat, then developed into severe coughing.  His coughing has gradually been getting worse, sometimes he has had vomiting with his cough.  He has not been taking any over-the-counter medications, just cough drops.  He is concerned because now he is starting to have a fever as well    Patient Active Problem List    Diagnosis Date Noted   • Rash and nonspecific skin eruption 04/13/2018   • Anxiety 11/23/2016   • Lumbar back pain 06/10/2015       Allergies:Bee venom    Current Outpatient Prescriptions Ordered in [facility]   Medication Sig Dispense Refill   • MethylPREDNISolone (MEDROL DOSEPAK) 4 MG Tablet Therapy Pack Take 1 Tab by mouth See Admin Instructions. 21 Tab 0   • albuterol 108 (90 Base) MCG/ACT Aero Soln inhalation aerosol Inhale 2 Puffs by mouth every four hours as needed. 1 Inhaler 0   • azithromycin (ZITHROMAX) 250 MG Tab Follow package directions 6 Tab 0   • promethazine-dextromethorphan (PROMETHAZINE-DM) 6.25-15 MG/5ML syrup Take 5 mL by mouth every four hours as needed for Cough. 120 mL 0   • HYDROXYZINE HCL PO Take  by mouth.     • hydrOXYzine HCl (ATARAX) 25 MG Tab Take 1 Tab by mouth 3 times a day as needed. 30 Tab 0   • diclofenac EC (VOLTAREN) 75 MG Tablet Delayed Response TAKE 1 TABLET BY MOUTH TWICE DAILY 180 Tab 0   • amoxicillin (AMOXIL) 875 MG tablet 1 TAB TWICE A DAY X 10 DAYS. 20 Tab 0   • fluocinonide (LIDEX) 0.05 % Cream APPLY THIN FILM TO RASH UP TO 3 TIMES A DAY ONLY IF NEEDED. 30 g 2     No current Epic-ordered facility-administered medications on file.        Past Medical History:   Diagnosis Date   • Anxiety 11/23/2016   • Controlled substance agreement broken 12/20/2016   • Hangnail 12/20/2016   • Lumbar back pain 6/10/2015       Social History   Substance Use Topics   • Smoking  "status: Former Smoker     Packs/day: 0.25     Years: 15.00     Types: Cigarettes   • Smokeless tobacco: Never Used      Comment: Quit 2 years ago   • Alcohol use Yes      Comment: rare       No family status information on file.     Family History   Problem Relation Age of Onset   • Adopted: Yes       ROS:  Review of Systems   Constitutional: Positive for fever, chills, w no eight loss and malaise/fatigue.   HENT: Negative for ear pain, nosebleeds, congestion, positive for resolved sore throat and neck pain.    Eyes: Negative for blurred vision.   Respiratory: Positive for worsening cough, negative for sputum production, has had cough related shortness of breath and wheezing.    Cardiovascular: Negative for chest pain, palpitations, orthopnea and leg swelling.   Gastrointestinal: Negative for heartburn, nausea, vomiting and abdominal pain.   Genitourinary: Negative for dysuria, urgency and frequency.     Exam:  Blood pressure 124/86, pulse (!) 112, temperature 37.3 °C (99.1 °F), temperature source Temporal, resp. rate 16, height 1.778 m (5' 10\"), weight 84.4 kg (186 lb), SpO2 94 %.  General:  Well nourished, well developed male in NAD, frequent harsh dry cough  Head:Normocephalic, atraumatic  Eyes: PERRLA, EOM within normal limits, no conjunctival injection, no scleral icterus, visual fields and acuity grossly intact.  Ears: Normal shape and symmetry, no tenderness, no discharge. External canals are without any significant edema or erythema. Tympanic membranes are without any inflammation, no effusion. Gross auditory acuity is intact  Nose: Symmetrical without tenderness, no discharge.  Mouth: reasonable hygiene, no erythema exudates or tonsillar enlargement.  Neck: no masses, range of motion within normal limits, no tracheal deviation. No obvious thyroid enlargement.  Pulmonary: chest is symmetrical with respiration, bronchial bilaterally, a few scattered wheezes, no rales or rhonchi   cardiovascular: Slightly " tachycardic rate, regular rhythm without murmurs, rubs, or gallops.  Extremities: no clubbing, cyanosis, or edema.    Please note that this dictation was created using voice recognition software. I have made every reasonable attempt to correct obvious errors, but I expect that there are errors of grammar and possibly content that I did not discover before finalizing the note.    Assessment/Plan:  1. Bronchitis  MethylPREDNISolone (MEDROL DOSEPAK) 4 MG Tablet Therapy Pack    albuterol 108 (90 Base) MCG/ACT Aero Soln inhalation aerosol    azithromycin (ZITHROMAX) 250 MG Tab   2. Cough  promethazine-dextromethorphan (PROMETHAZINE-DM) 6.25-15 MG/5ML syrup       Followup with primary care in the next 7-10 days if not significantly improving, return to the urgent care or go to the emergency room sooner for any worsening of symptoms.

## 2019-05-16 ENCOUNTER — OFFICE VISIT (OUTPATIENT)
Dept: URGENT CARE | Facility: PHYSICIAN GROUP | Age: 44
End: 2019-05-16
Payer: COMMERCIAL

## 2019-05-16 VITALS
HEART RATE: 96 BPM | RESPIRATION RATE: 16 BRPM | SYSTOLIC BLOOD PRESSURE: 118 MMHG | TEMPERATURE: 98.1 F | BODY MASS INDEX: 26.2 KG/M2 | OXYGEN SATURATION: 97 % | WEIGHT: 183 LBS | DIASTOLIC BLOOD PRESSURE: 82 MMHG | HEIGHT: 70 IN

## 2019-05-16 DIAGNOSIS — H69.93 DYSFUNCTION OF BOTH EUSTACHIAN TUBES: ICD-10-CM

## 2019-05-16 DIAGNOSIS — J30.9 ALLERGIC RHINITIS, UNSPECIFIED SEASONALITY, UNSPECIFIED TRIGGER: ICD-10-CM

## 2019-05-16 DIAGNOSIS — J02.9 ALLERGIC PHARYNGITIS: ICD-10-CM

## 2019-05-16 PROCEDURE — 99214 OFFICE O/P EST MOD 30 MIN: CPT | Performed by: PHYSICIAN ASSISTANT

## 2019-05-16 RX ORDER — PREDNISONE 10 MG/1
TABLET ORAL
Qty: 30 TAB | Refills: 0 | Status: SHIPPED | OUTPATIENT
Start: 2019-05-16 | End: 2019-05-26

## 2019-05-16 NOTE — PROGRESS NOTES
Chief Complaint   Patient presents with   • Pharyngitis       HISTORY OF PRESENT ILLNESS: Patient is a 43 y.o. male who presents today because he has a sore throat that he has had for about 3 weeks.  He also has nasal congestion and bilateral ear plugging and popping sensation.  He states otherwise he feels just fine without any fevers, chills, nausea, vomiting or diarrhea, no other complaints.  He has been using ibuprofen, as well as Claritin and his symptoms have not gotten any better.    Patient Active Problem List    Diagnosis Date Noted   • Rash and nonspecific skin eruption 04/13/2018   • Anxiety 11/23/2016   • Lumbar back pain 06/10/2015       Allergies:Bee venom    Current Outpatient Prescriptions Ordered in [facility]   Medication Sig Dispense Refill   • predniSONE (DELTASONE) 10 MG Tab 4 PO QD X 4 day, 3 PO QD x 3 days, 2 PO QD x 2 days, 1 PO QD x 1 day 30 Tab 0   • MethylPREDNISolone (MEDROL DOSEPAK) 4 MG Tablet Therapy Pack Take 1 Tab by mouth See Admin Instructions. 21 Tab 0   • albuterol 108 (90 Base) MCG/ACT Aero Soln inhalation aerosol Inhale 2 Puffs by mouth every four hours as needed. 1 Inhaler 0   • azithromycin (ZITHROMAX) 250 MG Tab Follow package directions 6 Tab 0   • promethazine-dextromethorphan (PROMETHAZINE-DM) 6.25-15 MG/5ML syrup Take 5 mL by mouth every four hours as needed for Cough. 120 mL 0   • HYDROXYZINE HCL PO Take  by mouth.     • hydrOXYzine HCl (ATARAX) 25 MG Tab Take 1 Tab by mouth 3 times a day as needed. 30 Tab 0   • diclofenac EC (VOLTAREN) 75 MG Tablet Delayed Response TAKE 1 TABLET BY MOUTH TWICE DAILY 180 Tab 0   • amoxicillin (AMOXIL) 875 MG tablet 1 TAB TWICE A DAY X 10 DAYS. 20 Tab 0   • fluocinonide (LIDEX) 0.05 % Cream APPLY THIN FILM TO RASH UP TO 3 TIMES A DAY ONLY IF NEEDED. 30 g 2     No current Epic-ordered facility-administered medications on file.        Past Medical History:   Diagnosis Date   • Anxiety 11/23/2016   • Controlled substance agreement broken  "12/20/2016   • Hangnail 12/20/2016   • Lumbar back pain 6/10/2015       Social History   Substance Use Topics   • Smoking status: Former Smoker     Packs/day: 0.25     Years: 15.00     Types: Cigarettes   • Smokeless tobacco: Never Used      Comment: Quit 2 years ago   • Alcohol use Yes      Comment: rare       No family status information on file.     Family History   Problem Relation Age of Onset   • Adopted: Yes       ROS:  Review of Systems   Constitutional: Negative for fever, chills, weight loss and malaise/fatigue.   HENT: Positive for ear pain, no nosebleeds, positive for congestion, sore throat and neck pain.    Eyes: Negative for blurred vision.   Respiratory: Negative for cough, sputum production, shortness of breath and wheezing.    Cardiovascular: Negative for chest pain, palpitations, orthopnea and leg swelling.   Gastrointestinal: Negative for heartburn, nausea, vomiting and abdominal pain.   Genitourinary: Negative for dysuria, urgency and frequency.     Exam:  /82 (BP Location: Left arm, Patient Position: Sitting, BP Cuff Size: Adult)   Pulse 96   Temp 36.7 °C (98.1 °F) (Temporal)   Resp 16   Ht 1.778 m (5' 10\")   Wt 83 kg (183 lb)   SpO2 97%   General:  Well nourished, well developed male in NAD  Head:Normocephalic, atraumatic  Eyes: PERRLA, EOM within normal limits, no conjunctival injection, no scleral icterus, visual fields and acuity grossly intact.  Ears: Normal shape and symmetry, no tenderness, no discharge. External canals are without any significant edema or erythema. Tympanic membranes are without any inflammation, moderate amount of cloudy fluid behind the tympanic membranes bilaterally. Gross auditory acuity is intact  Nose: Symmetrical without tenderness, no discharge.  Nasal mucosa is pale and edematous bilaterally  Mouth: reasonable hygiene, no pharyngeal erythema exudates or  tonsillar enlargement.  Soft palate and uvula are pale and edematous  Neck: no masses, range of " motion within normal limits, no tracheal deviation. No obvious thyroid enlargement.  Pulmonary: chest is symmetrical with respiration, no wheezes, crackles, or rhonchi.  Cardiovascular: regular rate and rhythm without murmurs, rubs, or gallops.  Extremities: no clubbing, cyanosis, or edema.    Please note that this dictation was created using voice recognition software. I have made every reasonable attempt to correct obvious errors, but I expect that there are errors of grammar and possibly content that I did not discover before finalizing the note.    Assessment/Plan:  1. Allergic pharyngitis  predniSONE (DELTASONE) 10 MG Tab   2. Allergic rhinitis, unspecified seasonality, unspecified trigger  predniSONE (DELTASONE) 10 MG Tab   3. Dysfunction of both eustachian tubes  predniSONE (DELTASONE) 10 MG Tab       Followup with primary care in the next 7-10 days if not significantly improving, return to the urgent care or go to the emergency room sooner for any worsening of symptoms.

## 2020-02-25 ENCOUNTER — OFFICE VISIT (OUTPATIENT)
Dept: URGENT CARE | Facility: PHYSICIAN GROUP | Age: 45
End: 2020-02-25
Payer: COMMERCIAL

## 2020-02-25 VITALS
WEIGHT: 181 LBS | SYSTOLIC BLOOD PRESSURE: 120 MMHG | HEART RATE: 82 BPM | HEIGHT: 70 IN | BODY MASS INDEX: 25.91 KG/M2 | OXYGEN SATURATION: 96 % | DIASTOLIC BLOOD PRESSURE: 90 MMHG | TEMPERATURE: 97.2 F

## 2020-02-25 DIAGNOSIS — R05.9 COUGH: ICD-10-CM

## 2020-02-25 DIAGNOSIS — J40 BRONCHITIS: ICD-10-CM

## 2020-02-25 DIAGNOSIS — R06.2 WHEEZE: ICD-10-CM

## 2020-02-25 PROCEDURE — 99214 OFFICE O/P EST MOD 30 MIN: CPT | Performed by: PHYSICIAN ASSISTANT

## 2020-02-25 RX ORDER — METHYLPREDNISOLONE 4 MG/1
4 TABLET ORAL SEE ADMIN INSTRUCTIONS
Qty: 21 TAB | Refills: 0 | Status: SHIPPED | OUTPATIENT
Start: 2020-02-25 | End: 2020-03-03

## 2020-02-25 RX ORDER — ALBUTEROL SULFATE 90 UG/1
2 AEROSOL, METERED RESPIRATORY (INHALATION) EVERY 4 HOURS PRN
Qty: 1 INHALER | Refills: 0 | Status: SHIPPED | OUTPATIENT
Start: 2020-02-25 | End: 2021-07-02

## 2020-02-25 RX ORDER — AZITHROMYCIN 250 MG/1
TABLET, FILM COATED ORAL
Qty: 6 TAB | Refills: 0 | Status: SHIPPED | OUTPATIENT
Start: 2020-02-25 | End: 2020-03-03

## 2020-02-25 RX ORDER — DEXTROMETHORPHAN HYDROBROMIDE AND PROMETHAZINE HYDROCHLORIDE 15; 6.25 MG/5ML; MG/5ML
5 SYRUP ORAL EVERY 4 HOURS PRN
Qty: 120 ML | Refills: 0 | Status: SHIPPED | OUTPATIENT
Start: 2020-02-25 | End: 2020-03-03

## 2020-02-25 NOTE — PROGRESS NOTES
Chief Complaint   Patient presents with   • Cough     Pt reports severe cough, chills body aches,fatigued, headaches. Onset 2 weeks       HISTORY OF PRESENT ILLNESS: Patient is a 44 y.o. male who presents today because he has a 2-week history of respiratory illness that started with what he thought was a cold with nasal congestion and a cough and a sore throat.  His symptoms seem to stagnate, not getting any better or worse up until the last 4 days when he is gotten significantly worse with phlegm production as well    Patient Active Problem List    Diagnosis Date Noted   • Rash and nonspecific skin eruption 04/13/2018   • Anxiety 11/23/2016   • Lumbar back pain 06/10/2015       Allergies:Bee venom    Current Outpatient Medications Ordered in Epic   Medication Sig Dispense Refill   • albuterol 108 (90 Base) MCG/ACT Aero Soln inhalation aerosol Inhale 2 Puffs by mouth every four hours as needed. 1 Inhaler 0   • azithromycin (ZITHROMAX) 250 MG Tab Follow package directions 6 Tab 0   • methylPREDNISolone (MEDROL DOSEPAK) 4 MG Tablet Therapy Pack Take 1 Tab by mouth See Admin Instructions. 21 Tab 0   • promethazine-dextromethorphan (PROMETHAZINE-DM) 6.25-15 MG/5ML syrup Take 5 mL by mouth every four hours as needed for Cough. 120 mL 0   • hydrOXYzine HCl (ATARAX) 25 MG Tab Take 1 Tab by mouth 3 times a day as needed. 30 Tab 0   • MethylPREDNISolone (MEDROL DOSEPAK) 4 MG Tablet Therapy Pack Take 1 Tab by mouth See Admin Instructions. (Patient not taking: Reported on 2/25/2020) 21 Tab 0   • albuterol 108 (90 Base) MCG/ACT Aero Soln inhalation aerosol Inhale 2 Puffs by mouth every four hours as needed. (Patient not taking: Reported on 2/25/2020) 1 Inhaler 0   • azithromycin (ZITHROMAX) 250 MG Tab Follow package directions (Patient not taking: Reported on 2/25/2020) 6 Tab 0   • promethazine-dextromethorphan (PROMETHAZINE-DM) 6.25-15 MG/5ML syrup Take 5 mL by mouth every four hours as needed for Cough. (Patient not taking:  "Reported on 2020) 120 mL 0   • HYDROXYZINE HCL PO Take  by mouth.     • diclofenac EC (VOLTAREN) 75 MG Tablet Delayed Response TAKE 1 TABLET BY MOUTH TWICE DAILY (Patient not taking: Reported on 2020) 180 Tab 0   • amoxicillin (AMOXIL) 875 MG tablet 1 TAB TWICE A DAY X 10 DAYS. (Patient not taking: Reported on 2020) 20 Tab 0   • fluocinonide (LIDEX) 0.05 % Cream APPLY THIN FILM TO RASH UP TO 3 TIMES A DAY ONLY IF NEEDED. (Patient not taking: Reported on 2020) 30 g 2     No current Epic-ordered facility-administered medications on file.        Past Medical History:   Diagnosis Date   • Anxiety 2016   • Controlled substance agreement broken 2016   • Hangnail 2016   • Lumbar back pain 6/10/2015       Social History     Tobacco Use   • Smoking status: Former Smoker     Packs/day: 0.25     Years: 15.00     Pack years: 3.75     Types: Cigarettes     Last attempt to quit: 2013     Years since quittin.0   • Smokeless tobacco: Never Used   Substance Use Topics   • Alcohol use: Yes     Comment: rare   • Drug use: No       No family status information on file.     Family History   Adopted: Yes       ROS:  Review of Systems   Constitutional: Positive for fever, chills, myalgias and malaise/fatigue.   HENT: Negative for ear pain, nosebleeds, positive for congestion, sore throat and no neck pain.    Eyes: Negative for blurred vision.   Respiratory: Positive for cough, sputum production, shortness of breath and wheezing.    Cardiovascular: Negative for chest pain, palpitations, orthopnea and leg swelling.   Gastrointestinal: Negative for heartburn, nausea, vomiting and abdominal pain.   Genitourinary: Negative for dysuria, urgency and frequency.     Exam:  /90 (BP Location: Left arm, Patient Position: Sitting, BP Cuff Size: Adult)   Pulse 82   Temp 36.2 °C (97.2 °F) (Temporal)   Ht 1.778 m (5' 10\")   Wt 82.1 kg (181 lb)   SpO2 96%   General:  Well nourished, well developed " male in NAD  Head:Normocephalic, atraumatic  Eyes: PERRLA, EOM within normal limits, no conjunctival injection, no scleral icterus, visual fields and acuity grossly intact.  Ears: Normal shape and symmetry, no tenderness, no discharge. External canals are without any significant edema or erythema. Tympanic membranes are without any inflammation, no effusion. Gross auditory acuity is intact  Nose: Symmetrical without tenderness, no discharge.  Mouth: reasonable hygiene, no erythema exudates or tonsillar enlargement.  Neck: no masses, range of motion within normal limits, no tracheal deviation. No obvious thyroid enlargement.  Pulmonary: chest is symmetrical with respiration, few scattered wheezes bilaterally, scattered rales and rhonchi bilaterally, not clearing with cough   cardiovascular: regular rate and rhythm without murmurs, rubs, or gallops.  Extremities: no clubbing, cyanosis, or edema.    Please note that this dictation was created using voice recognition software. I have made every reasonable attempt to correct obvious errors, but I expect that there are errors of grammar and possibly content that I did not discover before finalizing the note.    Assessment/Plan:  1. Bronchitis  azithromycin (ZITHROMAX) 250 MG Tab    methylPREDNISolone (MEDROL DOSEPAK) 4 MG Tablet Therapy Pack   2. Cough  promethazine-dextromethorphan (PROMETHAZINE-DM) 6.25-15 MG/5ML syrup   3. Wheeze  albuterol 108 (90 Base) MCG/ACT Aero Soln inhalation aerosol       Followup with primary care in the next 7-10 days if not significantly improving, return to the urgent care or go to the emergency room sooner for any worsening of symptoms.

## 2020-03-03 ENCOUNTER — OFFICE VISIT (OUTPATIENT)
Dept: URGENT CARE | Facility: PHYSICIAN GROUP | Age: 45
End: 2020-03-03
Payer: COMMERCIAL

## 2020-03-03 VITALS
HEART RATE: 88 BPM | RESPIRATION RATE: 16 BRPM | TEMPERATURE: 97.3 F | OXYGEN SATURATION: 95 % | DIASTOLIC BLOOD PRESSURE: 78 MMHG | SYSTOLIC BLOOD PRESSURE: 116 MMHG | HEIGHT: 70 IN | BODY MASS INDEX: 25.91 KG/M2 | WEIGHT: 181 LBS

## 2020-03-03 DIAGNOSIS — R06.02 SOB (SHORTNESS OF BREATH): ICD-10-CM

## 2020-03-03 DIAGNOSIS — R06.2 WHEEZING: ICD-10-CM

## 2020-03-03 DIAGNOSIS — J22 LOWER RESP. TRACT INFECTION: ICD-10-CM

## 2020-03-03 PROCEDURE — 99214 OFFICE O/P EST MOD 30 MIN: CPT | Mod: 25 | Performed by: PHYSICIAN ASSISTANT

## 2020-03-03 PROCEDURE — 94640 AIRWAY INHALATION TREATMENT: CPT | Performed by: PHYSICIAN ASSISTANT

## 2020-03-03 RX ORDER — IPRATROPIUM BROMIDE AND ALBUTEROL SULFATE 2.5; .5 MG/3ML; MG/3ML
3 SOLUTION RESPIRATORY (INHALATION) ONCE
Status: COMPLETED | OUTPATIENT
Start: 2020-03-03 | End: 2020-03-03

## 2020-03-03 RX ORDER — PREDNISONE 10 MG/1
TABLET ORAL
Qty: 1 QUANTITY SUFFICIENT | Refills: 0 | Status: SHIPPED | OUTPATIENT
Start: 2020-03-03 | End: 2020-11-24

## 2020-03-03 RX ORDER — DOXYCYCLINE 100 MG/1
100 TABLET ORAL 2 TIMES DAILY
Qty: 14 TAB | Refills: 0 | Status: SHIPPED | OUTPATIENT
Start: 2020-03-03 | End: 2020-11-24

## 2020-03-03 RX ADMIN — IPRATROPIUM BROMIDE AND ALBUTEROL SULFATE 3 ML: 2.5; .5 SOLUTION RESPIRATORY (INHALATION) at 17:58

## 2020-03-04 NOTE — PROGRESS NOTES
Chief Complaint   Patient presents with   • Cough       HISTORY OF PRESENT ILLNESS: Patient is a 44 y.o. male who presents today for the following:    Patient comes in for reevaluation of a cough.  He states the cough is been going on for about 2 to 3 weeks.  He was seen 2/25/2020 for the same.  He was put on the steroids and antibiotic.  He states he is feeling better until his steroid dose started to decrease.  He has a persistent cough, worse when taking a deep breath.  He denies a history of asthma but has smoked in the past.  He denies any fever, chills, chills.  He is flying out in a couple of days and is afraid he may need more medication before then.    Patient Active Problem List    Diagnosis Date Noted   • Rash and nonspecific skin eruption 04/13/2018   • Anxiety 11/23/2016   • Lumbar back pain 06/10/2015       Allergies:Bee venom    Current Outpatient Medications Ordered in Epic   Medication Sig Dispense Refill   • predniSONE (DELTASONE) 10 MG Tab 50 mg x 1 day; 40 mg x 1 day; 30 mg x 1 day; 20 mg x 1 day; 10 mg x 1 day 1 Quantity Sufficient 0   • doxycycline monohydrate (ADOXA) 100 MG tablet Take 1 Tab by mouth 2 times a day. 14 Tab 0   • albuterol 108 (90 Base) MCG/ACT Aero Soln inhalation aerosol Inhale 2 Puffs by mouth every four hours as needed. 1 Inhaler 0   • hydrOXYzine HCl (ATARAX) 25 MG Tab Take 1 Tab by mouth 3 times a day as needed. 30 Tab 0     Current Facility-Administered Medications Ordered in Epic   Medication Dose Route Frequency Provider Last Rate Last Dose   • ipratropium-albuterol (DUONEB) nebulizer solution  3 mL Nebulization Once Kiraa Mart P.A.-C.           Past Medical History:   Diagnosis Date   • Anxiety 11/23/2016   • Controlled substance agreement broken 12/20/2016   • Hangnail 12/20/2016   • Lumbar back pain 6/10/2015       Social History     Tobacco Use   • Smoking status: Former Smoker     Packs/day: 0.25     Years: 15.00     Pack years: 3.75     Types: Cigarettes     " Last attempt to quit: 2013     Years since quittin.0   • Smokeless tobacco: Never Used   Substance Use Topics   • Alcohol use: Yes     Comment: rare   • Drug use: No       No family status information on file.     Family History   Adopted: Yes       Review of Systems:   Constitutional ROS: No unexpected change in weight, No weakness, No fatigue  Eye ROS: No recent significant change in vision, No eye pain, redness, discharge  Ear ROS: No drainage, No tinnitus or vertigo, No recent change in hearing  Mouth/Throat ROS: No teeth or gum problems, No bleeding gums, No tongue complaints  Neck ROS: No swollen glands, No significant pain in neck  Pulmonary ROS: No chronic cough, sputum, or hemoptysis, No dyspnea on exertion, No wheezing  Cardiovascular ROS: No diaphoresis, No edema, No palpitations  Musculoskeletal/Extremities ROS: No peripheral edema, No pain, redness or swelling on the joints  Hematologic/Lymphatic ROS: No chills, No night sweats, No weight loss  Skin/Integumentary ROS: No edema, No evidence of rash, No itching      Exam:  /78 (BP Location: Right arm, Patient Position: Sitting, BP Cuff Size: Large adult)   Pulse 88   Temp 36.3 °C (97.3 °F) (Temporal)   Resp 16   Ht 1.778 m (5' 10\")   Wt 82.1 kg (181 lb)   SpO2 95%   General: Well developed, well nourished. No distress.    Eye: PERRL/EOMI; conjunctivae clear, lids normal.  ENMT: Lips without lesions, MMM. Oropharynx is clear. Bilateral TMs are within normal limits.  Pulmonary: Unlabored respiratory effort.  Diffuse inspiratory expiratory wheezes.  Patient is coughing during most of the clinic visit.  Cardiovascular: Regular rate and rhythm without murmur.   Neurologic: Grossly nonfocal. No facial asymmetry noted.  Lymph: No cervical lymphadenopathy noted.  Skin: Warm, dry, good turgor. No rashes in visible areas.   Psych: Normal mood. Alert and oriented to person, place and time.    DuoNeb x1: Increase in breath sounds throughout.  " Diffuse rhonchi in the bases.  Patient is feeling much better.    Assessment/Plan:  Discussed possible viral etiology.  Patient is feeling better.  Recommend starting steroids and delaying antibiotics.  Discussed appropriate over-the-counter symptomatic medication, and when to return to clinic. Follow up for worsening or persistent symptoms.  1. Lower resp. tract infection  doxycycline monohydrate (ADOXA) 100 MG tablet   2. SOB (shortness of breath)  predniSONE (DELTASONE) 10 MG Tab    ipratropium-albuterol (DUONEB) nebulizer solution   3. Wheezing  predniSONE (DELTASONE) 10 MG Tab    ipratropium-albuterol (DUONEB) nebulizer solution

## 2020-03-26 ENCOUNTER — OFFICE VISIT (OUTPATIENT)
Dept: URGENT CARE | Facility: PHYSICIAN GROUP | Age: 45
End: 2020-03-26
Payer: COMMERCIAL

## 2020-03-26 VITALS
HEART RATE: 86 BPM | SYSTOLIC BLOOD PRESSURE: 118 MMHG | OXYGEN SATURATION: 97 % | DIASTOLIC BLOOD PRESSURE: 80 MMHG | RESPIRATION RATE: 20 BRPM | TEMPERATURE: 98.6 F

## 2020-03-26 DIAGNOSIS — J18.9 PNEUMONIA OF RIGHT LOWER LOBE DUE TO INFECTIOUS ORGANISM: ICD-10-CM

## 2020-03-26 PROCEDURE — 99214 OFFICE O/P EST MOD 30 MIN: CPT | Performed by: PHYSICIAN ASSISTANT

## 2020-03-26 RX ORDER — PREDNISONE 10 MG/1
TABLET ORAL
Qty: 30 TAB | Refills: 0 | Status: SHIPPED | OUTPATIENT
Start: 2020-03-26 | End: 2020-11-24

## 2020-03-26 RX ORDER — ALBUTEROL SULFATE 90 UG/1
2 AEROSOL, METERED RESPIRATORY (INHALATION) EVERY 4 HOURS PRN
Qty: 1 INHALER | Refills: 0 | Status: SHIPPED | OUTPATIENT
Start: 2020-03-26 | End: 2020-11-24

## 2020-03-26 RX ORDER — AZITHROMYCIN 250 MG/1
TABLET, FILM COATED ORAL
Qty: 6 TAB | Refills: 0 | Status: SHIPPED | OUTPATIENT
Start: 2020-03-26 | End: 2020-11-24

## 2020-03-26 RX ORDER — AMOXICILLIN AND CLAVULANATE POTASSIUM 875; 125 MG/1; MG/1
1 TABLET, FILM COATED ORAL 2 TIMES DAILY
Qty: 20 TAB | Refills: 0 | Status: SHIPPED | OUTPATIENT
Start: 2020-03-26 | End: 2020-04-05

## 2020-03-26 RX ORDER — INHALER, ASSIST DEVICES
SPACER (EA) MISCELLANEOUS
COMMUNITY
Start: 2020-02-25 | End: 2021-07-02

## 2020-03-26 NOTE — LETTER
March 26, 2020         Patient: Skip Bernard   YOB: 1975   Date of Visit: 3/26/2020           To Whom it May Concern:    Skip Bernard was seen in my clinic on 3/26/2020. He may not return to work until 4/3/2020, assuming no fevers in the 72 hours prior to that date.    If you have any questions or concerns, please don't hesitate to call.        Sincerely,           Juan Espitia P.A.-C.  Electronically Signed     
No lymphadedenopathy

## 2020-11-24 ENCOUNTER — HOSPITAL ENCOUNTER (OUTPATIENT)
Facility: MEDICAL CENTER | Age: 45
End: 2020-11-24
Attending: PHYSICIAN ASSISTANT
Payer: COMMERCIAL

## 2020-11-24 ENCOUNTER — OFFICE VISIT (OUTPATIENT)
Dept: URGENT CARE | Facility: PHYSICIAN GROUP | Age: 45
End: 2020-11-24
Payer: COMMERCIAL

## 2020-11-24 VITALS
HEART RATE: 96 BPM | DIASTOLIC BLOOD PRESSURE: 78 MMHG | OXYGEN SATURATION: 95 % | WEIGHT: 174 LBS | BODY MASS INDEX: 24.91 KG/M2 | TEMPERATURE: 98.4 F | SYSTOLIC BLOOD PRESSURE: 120 MMHG | HEIGHT: 70 IN | RESPIRATION RATE: 18 BRPM

## 2020-11-24 DIAGNOSIS — R68.89 FLU-LIKE SYMPTOMS: ICD-10-CM

## 2020-11-24 DIAGNOSIS — R09.81 NASAL CONGESTION: ICD-10-CM

## 2020-11-24 DIAGNOSIS — R06.02 SOB (SHORTNESS OF BREATH): ICD-10-CM

## 2020-11-24 PROCEDURE — U0003 INFECTIOUS AGENT DETECTION BY NUCLEIC ACID (DNA OR RNA); SEVERE ACUTE RESPIRATORY SYNDROME CORONAVIRUS 2 (SARS-COV-2) (CORONAVIRUS DISEASE [COVID-19]), AMPLIFIED PROBE TECHNIQUE, MAKING USE OF HIGH THROUGHPUT TECHNOLOGIES AS DESCRIBED BY CMS-2020-01-R: HCPCS

## 2020-11-24 PROCEDURE — 99214 OFFICE O/P EST MOD 30 MIN: CPT | Performed by: PHYSICIAN ASSISTANT

## 2020-11-24 NOTE — PROGRESS NOTES
Chief Complaint   Patient presents with   • Cough   • Pharyngitis       HISTORY OF PRESENT ILLNESS: Patient is a 45 y.o. male who presents today for the following:    Cough x 4 days  + ST, body aches, chills, nasal congestion, mild SOB  Denies fever  + fatigue  OTC meds tried: none    Patient Active Problem List    Diagnosis Date Noted   • Rash and nonspecific skin eruption 2018   • Anxiety 2016   • Lumbar back pain 06/10/2015       Allergies:Bee venom    Current Outpatient Medications Ordered in Epic   Medication Sig Dispense Refill   • Spacer/Aero-Holding Chambers (VALVED HOLDING CHAMBER) Device USE WITH INHALER     • albuterol 108 (90 Base) MCG/ACT Aero Soln inhalation aerosol Inhale 2 Puffs by mouth every four hours as needed. 1 Inhaler 0     No current Epic-ordered facility-administered medications on file.        Past Medical History:   Diagnosis Date   • Anxiety 2016   • Controlled substance agreement broken 2016   • Hangnail 2016   • Lumbar back pain 6/10/2015       Social History     Tobacco Use   • Smoking status: Former Smoker     Packs/day: 0.25     Years: 15.00     Pack years: 3.75     Types: Cigarettes     Quit date: 2013     Years since quittin.7   • Smokeless tobacco: Never Used   Substance Use Topics   • Alcohol use: Yes     Comment: rare   • Drug use: No       No family status information on file.     Family History   Adopted: Yes       Review of Systems:    Constitutional ROS: + fatigue  Eye ROS: No recent significant change in vision, No eye pain, redness, discharge  Ear ROS: No drainage, No tinnitus or vertigo, No recent change in hearing  Mouth/Throat ROS: No teeth or gum problems, No bleeding gums, No tongue complaints  Neck ROS: No swollen glands, No significant pain in neck  Pulmonary ROS: + SOB  Cardiovascular ROS: No diaphoresis, No edema, No palpitations  Musculoskeletal/Extremities ROS: No peripheral edema, No pain, redness or swelling on the  "joints  Hematologic/Lymphatic ROS: + body aches, chills  Skin/Integumentary ROS: No edema, No evidence of rash, No itching      Exam:  /78 (BP Location: Right arm, Patient Position: Sitting, BP Cuff Size: Adult)   Pulse 96   Temp 36.9 °C (98.4 °F) (Temporal)   Resp 18   Ht 1.778 m (5' 10\")   Wt 78.9 kg (174 lb)   SpO2 95%   General: Well developed, well nourished. No distress.    Eye: PERRL/EOMI; conjunctivae clear, lids normal.  ENMT: Lips without lesions, MMM. Oropharynx is clear. Bilateral TMs are within normal limits.  Pulmonary: Unlabored respiratory effort. Lungs clear to auscultation, no wheezes, no rhonchi.    Cardiovascular: Regular rate and rhythm without murmur.   Neurologic: Grossly nonfocal. No facial asymmetry noted.  Lymph: No cervical lymphadenopathy noted.  Skin: Warm, dry, good turgor. No rashes in visible areas.   Psych: Normal mood. Alert and oriented to person, place and time.    Assessment/Plan:  Discussed likely viral etiology.  Vitals and exam are unremarkable.  Low suspicion for pneumonia. Patient will be tested for COVID and has been advised to quarantine until results are available. Discussed appropriate over-the-counter symptomatic medication, and when to return to clinic. Follow up for worsening or persistent symptoms.  1. Flu-like symptoms  COVID/SARS COV-2 PCR   2. Nasal congestion     3. SOB (shortness of breath)  DX-CHEST-2 VIEWS    COVID/SARS COV-2 PCR       "

## 2020-11-25 DIAGNOSIS — R68.89 FLU-LIKE SYMPTOMS: ICD-10-CM

## 2020-11-25 DIAGNOSIS — R06.02 SOB (SHORTNESS OF BREATH): ICD-10-CM

## 2020-11-25 LAB — COVID ORDER STATUS COVID19: NORMAL

## 2020-11-26 LAB
SARS-COV-2 RNA RESP QL NAA+PROBE: NOTDETECTED
SPECIMEN SOURCE: NORMAL

## 2021-03-22 ENCOUNTER — OFFICE VISIT (OUTPATIENT)
Dept: URGENT CARE | Facility: PHYSICIAN GROUP | Age: 46
End: 2021-03-22
Payer: COMMERCIAL

## 2021-03-22 VITALS
TEMPERATURE: 99.1 F | HEIGHT: 69 IN | HEART RATE: 84 BPM | RESPIRATION RATE: 16 BRPM | SYSTOLIC BLOOD PRESSURE: 116 MMHG | BODY MASS INDEX: 25.77 KG/M2 | OXYGEN SATURATION: 96 % | DIASTOLIC BLOOD PRESSURE: 76 MMHG | WEIGHT: 174 LBS

## 2021-03-22 DIAGNOSIS — J22 LOWER RESP. TRACT INFECTION: ICD-10-CM

## 2021-03-22 DIAGNOSIS — R05.9 COUGH: ICD-10-CM

## 2021-03-22 DIAGNOSIS — R06.2 WHEEZING: ICD-10-CM

## 2021-03-22 PROCEDURE — 99214 OFFICE O/P EST MOD 30 MIN: CPT | Performed by: PHYSICIAN ASSISTANT

## 2021-03-22 RX ORDER — AZITHROMYCIN 250 MG/1
TABLET, FILM COATED ORAL
Qty: 6 TABLET | Refills: 0 | Status: SHIPPED | OUTPATIENT
Start: 2021-03-22 | End: 2021-07-02

## 2021-03-22 RX ORDER — BENZONATATE 200 MG/1
200 CAPSULE ORAL 3 TIMES DAILY PRN
Qty: 60 CAPSULE | Refills: 0 | Status: SHIPPED | OUTPATIENT
Start: 2021-03-22 | End: 2021-07-02

## 2021-03-22 RX ORDER — ALBUTEROL SULFATE 90 UG/1
2 AEROSOL, METERED RESPIRATORY (INHALATION) EVERY 4 HOURS PRN
Qty: 18.2 G | Refills: 0 | Status: SHIPPED | OUTPATIENT
Start: 2021-03-22 | End: 2021-07-02

## 2021-03-22 NOTE — PROGRESS NOTES
Chief Complaint   Patient presents with   • Cough       HISTORY OF PRESENT ILLNESS: Patient is a 45 y.o. male who presents today because is a 2 to 3-week history of worsening cough.  Started with 2 days of a sore throat.  He has not been taking any medications for his current symptoms.    Patient Active Problem List    Diagnosis Date Noted   • Rash and nonspecific skin eruption 2018   • Anxiety 2016   • Lumbar back pain 06/10/2015       Allergies:Bee venom    Current Outpatient Medications Ordered in Epic   Medication Sig Dispense Refill   • benzonatate (TESSALON) 200 MG capsule Take 1 capsule by mouth 3 times a day as needed for Cough. 60 capsule 0   • azithromycin (ZITHROMAX) 250 MG Tab Follow package directions 6 tablet 0   • albuterol 108 (90 Base) MCG/ACT Aero Soln inhalation aerosol Inhale 2 Puffs every four hours as needed. With spacer device 18.2 g 0   • Spacer/Aero-Holding Chambers (VALVED HOLDING CHAMBER) Device USE WITH INHALER     • albuterol 108 (90 Base) MCG/ACT Aero Soln inhalation aerosol Inhale 2 Puffs by mouth every four hours as needed. (Patient not taking: Reported on 3/22/2021) 1 Inhaler 0     No current Epic-ordered facility-administered medications on file.       Past Medical History:   Diagnosis Date   • Anxiety 2016   • Controlled substance agreement broken 2016   • Hangnail 2016   • Lumbar back pain 6/10/2015       Social History     Tobacco Use   • Smoking status: Former Smoker     Packs/day: 0.25     Years: 15.00     Pack years: 3.75     Types: Cigarettes     Quit date: 2013     Years since quittin.0   • Smokeless tobacco: Never Used   Substance Use Topics   • Alcohol use: Yes     Comment: rare   • Drug use: No       No family status information on file.     Family History   Adopted: Yes       ROS:  Review of Systems   Constitutional: Positive for fever, no chills, weight loss and malaise/fatigue.   HENT: Negative for ear pain, nosebleeds, positive  "for congestion, resolved sore throat and no neck pain.    Eyes: Negative for blurred vision.   Respiratory: Positive for cough, sputum production, shortness of breath and wheezing.    Cardiovascular: Negative for chest pain, palpitations, orthopnea and leg swelling.   Gastrointestinal: Negative for heartburn, nausea, vomiting and abdominal pain.   Genitourinary: Negative for dysuria, urgency and frequency.     Exam:  /76 (BP Location: Right arm, Patient Position: Sitting, BP Cuff Size: Adult)   Pulse 84   Temp 37.3 °C (99.1 °F) (Temporal)   Resp 16   Ht 1.753 m (5' 9\")   Wt 78.9 kg (174 lb)   SpO2 96%   General:  Well nourished, well developed male in NAD  Head:Normocephalic, atraumatic  Eyes: PERRLA, EOM within normal limits, no conjunctival injection, no scleral icterus, visual fields and acuity grossly intact.  Ears: Normal shape and symmetry, no tenderness, no discharge. External canals are without any significant edema or erythema. Tympanic membranes are without any inflammation, no effusion. Gross auditory acuity is intact  Nose: Symmetrical without tenderness, no discharge.  Mouth: reasonable hygiene, no erythema exudates or tonsillar enlargement.  Neck: no masses, range of motion within normal limits, no tracheal deviation. No obvious thyroid enlargement.  Pulmonary: chest is symmetrical with respiration, few faint wheezes bilaterally, few rales in the left lower lobe cardiovascular: regular rate and rhythm without murmurs, rubs, or gallops.  Extremities: no clubbing, cyanosis, or edema.    Please note that this dictation was created using voice recognition software. I have made every reasonable attempt to correct obvious errors, but I expect that there are errors of grammar and possibly content that I did not discover before finalizing the note.    Assessment/Plan:  1. Lower resp. tract infection  azithromycin (ZITHROMAX) 250 MG Tab   2. Wheezing  albuterol 108 (90 Base) MCG/ACT Aero Soln " inhalation aerosol   3. Cough  benzonatate (TESSALON) 200 MG capsule       Followup with primary care in the next 7-10 days if not significantly improving, return to the urgent care or go to the emergency room sooner for any worsening of symptoms.

## 2021-07-02 ENCOUNTER — OFFICE VISIT (OUTPATIENT)
Dept: MEDICAL GROUP | Facility: PHYSICIAN GROUP | Age: 46
End: 2021-07-02
Payer: COMMERCIAL

## 2021-07-02 VITALS
HEIGHT: 69 IN | HEART RATE: 92 BPM | BODY MASS INDEX: 26.23 KG/M2 | SYSTOLIC BLOOD PRESSURE: 122 MMHG | TEMPERATURE: 98.5 F | DIASTOLIC BLOOD PRESSURE: 68 MMHG | RESPIRATION RATE: 16 BRPM | WEIGHT: 177.1 LBS | OXYGEN SATURATION: 96 %

## 2021-07-02 DIAGNOSIS — R53.83 FATIGUE, UNSPECIFIED TYPE: ICD-10-CM

## 2021-07-02 DIAGNOSIS — Z13.29 SCREENING FOR THYROID DISORDER: ICD-10-CM

## 2021-07-02 DIAGNOSIS — Z87.19 HISTORY OF PANCREATITIS: ICD-10-CM

## 2021-07-02 DIAGNOSIS — R10.13 EPIGASTRIC PAIN: ICD-10-CM

## 2021-07-02 DIAGNOSIS — Z13.21 ENCOUNTER FOR VITAMIN DEFICIENCY SCREENING: ICD-10-CM

## 2021-07-02 DIAGNOSIS — Z13.6 SCREENING FOR CARDIOVASCULAR CONDITION: ICD-10-CM

## 2021-07-02 DIAGNOSIS — R73.03 PREDIABETES: ICD-10-CM

## 2021-07-02 PROBLEM — R21 RASH AND NONSPECIFIC SKIN ERUPTION: Status: RESOLVED | Noted: 2018-04-13 | Resolved: 2021-07-02

## 2021-07-02 PROCEDURE — 99214 OFFICE O/P EST MOD 30 MIN: CPT | Performed by: NURSE PRACTITIONER

## 2021-07-02 ASSESSMENT — PATIENT HEALTH QUESTIONNAIRE - PHQ9: CLINICAL INTERPRETATION OF PHQ2 SCORE: 0

## 2021-07-02 NOTE — PROGRESS NOTES
"CC: Establish care, \"I think I have diabetes\"    HISTORY OF THE PRESENT ILLNESS: Patient is a 45 y.o. male. This pleasant patient is here today to establish care and for evaluation and management of the following health problems.    Patient has not had primary care provider in many years.  Family health history unknown as patient is adopted.      Fatigue  Patient is 45-year-old male here to establish care with me today.  Reports 3-month onset of fatigue, excessive thirst, frequent urination, body aches.  Reports falling asleep after high carb meals.  No appetite change.  Has had 2 or 3 episodes of vision changes that he describes as squiggly lines and feeling removed.  History of pancreatitis 8 months ago, seen at [place] [facility].  Please see additional notes under epigastric pain.  Patient wonders if he has diabetes.  He was seen at [place] urgent [facility] last week.  Point-of-care A1c was 6.1 and blood sugar was 178 3 hours after eating.  Does report poor sleep, wakes frequently, not new.    Epigastric pain  Patient is 45-year-old male who establish care with me today.  Reports intermittent epigastric and right upper quadrant pain.  Accompanied by mild nausea.  Does admit to severe heartburn at times, but this feels different.  Feels like the pain he was having when he was diagnosed with pancreatitis in the ER about 8 months ago.  Episodes last about 4 to 5 hours.  He curls up in a ball during that time until it resolves.  Denies lightheadedness, shortness of breath with episodes.  Does not associate it with eating a high-fat diet.  Does guarantee that if he drinks a Monster, the pain will occur.  But pain has occurred without drinking a Monster.  Drinks small amount of alcohol, approximately 2 drinks a week.    Prediabetes  Please see additional notes under fatigue.      Allergies: Bee venom    No current [facility]-ordered outpatient medications on file.     No current [facility]-ordered facility-administered medications on file. "       Past Medical History:   Diagnosis Date   • Anxiety 2016   • Controlled substance agreement broken 2016   • Hangnail 2016   • Lumbar back pain 6/10/2015       History reviewed. No pertinent surgical history.    Social History     Tobacco Use   • Smoking status: Current Some Day Smoker     Packs/day: 0.25     Years: 15.00     Pack years: 3.75     Types: Cigars     Last attempt to quit: 2013     Years since quittin.3   • Smokeless tobacco: Never Used   Vaping Use   • Vaping Use: Never used   Substance Use Topics   • Alcohol use: Yes     Alcohol/week: 1.2 oz     Types: 2 Standard drinks or equivalent per week   • Drug use: No       Family History   Adopted: Yes   Family history unknown: Yes       ROS:     - Constitutional: Negative for fever, chills. Positive unexpected weight change, and fatigue/generalized weakness.     - HEENT: Negative for hearing changes, ear pain, rhinorrhea, sinus congestion, and sore throat.  Positive headaches, vision changes.    - Respiratory: Negative for cough, dyspnea, and wheezing.      - Cardiovascular: Negative for chest pain, palpitations, orthopnea, and bilateral lower extremity edema.     - Gastrointestinal: Negative for vomiting, abdominal pain, hematochezia, melena, diarrhea, constipation. Positive heartburn and nausea.    - Genitourinary: Negative for dysuria, polyuria, hematuria, pyuria, urinary urgency, and urinary incontinence.    - Musculoskeletal: Negative for myalgias. Positive back pain, and joint pain.     - Skin: Negative for rash, cyanotic skin color change. Positive itching.    - Neurological: Negative for dizziness, tremors, focal sensory deficit, focal weakness. Positive tingling    - Endo/Heme/Allergies: Does not bruise/bleed easily.     - Psychiatric/Behavioral: Negative for depression, suicidal/homicidal ideation and memory loss.           Exam: /68 (BP Location: Left arm, Patient Position: Sitting, BP Cuff Size: Adult)    "Pulse 92   Temp 36.9 °C (98.5 °F) (Temporal)   Resp 16   Ht 1.753 m (5' 9\")   Wt 80.3 kg (177 lb 1.6 oz)   SpO2 96%  Body mass index is 26.15 kg/m².    General: alert, pleasant, well nourished, well developed male in NAD  HEENT: Normocephalic. Eyes conjunctiva clear lids without ptosis, pupils equal and reactive to light, ears normal shape and contour, canals are clear bilaterally, tympanic membranes are pearly gray with good light reflex, nasal mucosa without erythema and drainage, oropharynx is without erythema, edema or exudates.   Neck: Supple without bruit. Thyroid is not enlarged.  Pulmonary: Clear to ausculation.  Normal effort. No rales, ronchi, or wheezing.  Cardiovascular: Normal rate and rhythm without murmur. Carotid and radial pulses are intact and equal bilaterally.  No lower extremity edema.  Abdomen: Soft, moderate tenderness epigastrum, nondistended. Normal bowel sounds. Liver and spleen are not palpable  Neurologic: Grossly nonfocal  Lymph: No cervical or supraclavicular lymph nodes are palpable  Skin: Warm and dry.    Musculoskeletal: Normal gait.   Psych: Normal mood and affect. Alert and oriented. Judgment and insight is normal.    Please note that this dictation was created using voice recognition software. I have made every reasonable attempt to correct obvious errors, but I expect that there are errors of grammar and possibly content that I did not discover before finalizing the note.      Assessment/Plan  1. History of pancreatitis    - INSULIN ANTIBODIES; Future  - CESAR-65; Future  - C-PEPTIDE; Future  - LIPASE; Future  - AMYLASE; Future    2. Fatigue, unspecified type    - CBC WITH DIFFERENTIAL; Future    3. Prediabetes    - HEMOGLOBIN A1C; Future  - INSULIN ANTIBODIES; Future  - CESAR-65; Future  - C-PEPTIDE; Future  - TSH WITH REFLEX TO FT4; Future    4. Screening for cardiovascular condition    - Comp Metabolic Panel; Future  - ESTIMATED GFR; Future  - Lipid Profile; Future    5. " Encounter for vitamin deficiency screening    - VITAMIN D,25 HYDROXY; Future    6. Screening for thyroid disorder    - TSH WITH REFLEX TO FT4; Future    7. Epigastric pain    - US-ABDOMEN COMPLETE SURVEY; Future    Clinical decision making: Patient is having worsening of her abdominal symptoms, elevated blood sugar, fatigue.  Differentials include chronic pancreatitis, gallbladder disease, diabetes type 1 or 2, thyroid disease, GERD, pancreatic cancer.  We will get updated labs and abdominal ultrasound.  If all results are normal, consider referral to gastroenterology, glucose tolerance test, referral to sleep medicine. ER precautions reviewed with patient.    Patient will return to clinic in 2 weeks for lab review and follow up on symptoms.

## 2021-07-02 NOTE — ASSESSMENT & PLAN NOTE
Patient is 45-year-old male who establish care with me today.  Reports intermittent epigastric and right upper quadrant pain.  Accompanied by mild nausea.  Does admit to severe heartburn at times, but this feels different.  Feels like the pain he was having when he was diagnosed with pancreatitis in the ER about 8 months ago.  Episodes last about 4 to 5 hours.  He curls up in a ball during that time until it resolves.  Denies lightheadedness, shortness of breath with episodes.  Does not associate it with eating a high-fat diet.  Does guarantee that if he drinks a Monster, the pain will occur.  But pain has occurred without drinking a Monster.  Drinks small amount of alcohol, approximately 2 drinks a week.

## 2021-07-02 NOTE — ASSESSMENT & PLAN NOTE
Patient is 45-year-old male here to establish care with me today.  Reports 3-month onset of fatigue, excessive thirst, frequent urination, body aches.  Reports falling asleep after high carb meals.  No appetite change.  Has had 2 or 3 episodes of vision changes that he describes as squiggly lines and feeling removed.  History of pancreatitis 8 months ago, seen at [place] [facility].  Please see additional notes under epigastric pain.  Patient wonders if he has diabetes.  He was seen at [place] urgent care last week.  Point-of-care A1c was 6.1 and blood sugar was 178 3 hours after eating.  Does report poor sleep, wakes frequently, not new.

## 2021-07-06 ENCOUNTER — HOSPITAL ENCOUNTER (OUTPATIENT)
Dept: LAB | Facility: MEDICAL CENTER | Age: 46
End: 2021-07-06
Attending: NURSE PRACTITIONER
Payer: COMMERCIAL

## 2021-07-06 DIAGNOSIS — Z87.19 HISTORY OF PANCREATITIS: ICD-10-CM

## 2021-07-06 DIAGNOSIS — R53.83 FATIGUE, UNSPECIFIED TYPE: ICD-10-CM

## 2021-07-06 DIAGNOSIS — R73.03 PREDIABETES: ICD-10-CM

## 2021-07-06 DIAGNOSIS — Z13.21 ENCOUNTER FOR VITAMIN DEFICIENCY SCREENING: ICD-10-CM

## 2021-07-06 DIAGNOSIS — Z13.6 SCREENING FOR CARDIOVASCULAR CONDITION: ICD-10-CM

## 2021-07-06 DIAGNOSIS — Z13.29 SCREENING FOR THYROID DISORDER: ICD-10-CM

## 2021-07-06 PROCEDURE — 80053 COMPREHEN METABOLIC PANEL: CPT

## 2021-07-06 PROCEDURE — 84681 ASSAY OF C-PEPTIDE: CPT

## 2021-07-06 PROCEDURE — 82150 ASSAY OF AMYLASE: CPT

## 2021-07-06 PROCEDURE — 83690 ASSAY OF LIPASE: CPT

## 2021-07-06 PROCEDURE — 86341 ISLET CELL ANTIBODY: CPT

## 2021-07-06 PROCEDURE — 82306 VITAMIN D 25 HYDROXY: CPT

## 2021-07-06 PROCEDURE — 83036 HEMOGLOBIN GLYCOSYLATED A1C: CPT

## 2021-07-06 PROCEDURE — 36415 COLL VENOUS BLD VENIPUNCTURE: CPT

## 2021-07-06 PROCEDURE — 80061 LIPID PANEL: CPT

## 2021-07-06 PROCEDURE — 85025 COMPLETE CBC W/AUTO DIFF WBC: CPT

## 2021-07-06 PROCEDURE — 84443 ASSAY THYROID STIM HORMONE: CPT

## 2021-07-06 PROCEDURE — 86337 INSULIN ANTIBODIES: CPT

## 2021-07-07 LAB
25(OH)D3 SERPL-MCNC: 22 NG/ML (ref 30–100)
ALBUMIN SERPL BCP-MCNC: 4.5 G/DL (ref 3.2–4.9)
ALBUMIN/GLOB SERPL: 1.6 G/DL
ALP SERPL-CCNC: 88 U/L (ref 30–99)
ALT SERPL-CCNC: 20 U/L (ref 2–50)
AMYLASE SERPL-CCNC: 78 U/L (ref 20–103)
ANION GAP SERPL CALC-SCNC: 8 MMOL/L (ref 7–16)
AST SERPL-CCNC: 17 U/L (ref 12–45)
BASOPHILS # BLD AUTO: 0.8 % (ref 0–1.8)
BASOPHILS # BLD: 0.06 K/UL (ref 0–0.12)
BILIRUB SERPL-MCNC: 0.2 MG/DL (ref 0.1–1.5)
BUN SERPL-MCNC: 15 MG/DL (ref 8–22)
CALCIUM SERPL-MCNC: 8.9 MG/DL (ref 8.5–10.5)
CHLORIDE SERPL-SCNC: 105 MMOL/L (ref 96–112)
CHOLEST SERPL-MCNC: 194 MG/DL (ref 100–199)
CO2 SERPL-SCNC: 28 MMOL/L (ref 20–33)
CREAT SERPL-MCNC: 0.74 MG/DL (ref 0.5–1.4)
EOSINOPHIL # BLD AUTO: 0.35 K/UL (ref 0–0.51)
EOSINOPHIL NFR BLD: 4.4 % (ref 0–6.9)
ERYTHROCYTE [DISTWIDTH] IN BLOOD BY AUTOMATED COUNT: 41.9 FL (ref 35.9–50)
EST. AVERAGE GLUCOSE BLD GHB EST-MCNC: 137 MG/DL
FASTING STATUS PATIENT QL REPORTED: NORMAL
GLOBULIN SER CALC-MCNC: 2.8 G/DL (ref 1.9–3.5)
GLUCOSE SERPL-MCNC: 91 MG/DL (ref 65–99)
HBA1C MFR BLD: 6.4 % (ref 4–5.6)
HCT VFR BLD AUTO: 38.6 % (ref 42–52)
HDLC SERPL-MCNC: 43 MG/DL
HGB BLD-MCNC: 12.6 G/DL (ref 14–18)
IMM GRANULOCYTES # BLD AUTO: 0.02 K/UL (ref 0–0.11)
IMM GRANULOCYTES NFR BLD AUTO: 0.3 % (ref 0–0.9)
LDLC SERPL CALC-MCNC: 127 MG/DL
LIPASE SERPL-CCNC: 52 U/L (ref 11–82)
LYMPHOCYTES # BLD AUTO: 2.77 K/UL (ref 1–4.8)
LYMPHOCYTES NFR BLD: 34.8 % (ref 22–41)
MCH RBC QN AUTO: 28.7 PG (ref 27–33)
MCHC RBC AUTO-ENTMCNC: 32.6 G/DL (ref 33.7–35.3)
MCV RBC AUTO: 87.9 FL (ref 81.4–97.8)
MONOCYTES # BLD AUTO: 0.68 K/UL (ref 0–0.85)
MONOCYTES NFR BLD AUTO: 8.5 % (ref 0–13.4)
NEUTROPHILS # BLD AUTO: 4.08 K/UL (ref 1.82–7.42)
NEUTROPHILS NFR BLD: 51.2 % (ref 44–72)
NRBC # BLD AUTO: 0 K/UL
NRBC BLD-RTO: 0 /100 WBC
PLATELET # BLD AUTO: 325 K/UL (ref 164–446)
PMV BLD AUTO: 10.6 FL (ref 9–12.9)
POTASSIUM SERPL-SCNC: 4 MMOL/L (ref 3.6–5.5)
PROT SERPL-MCNC: 7.3 G/DL (ref 6–8.2)
RBC # BLD AUTO: 4.39 M/UL (ref 4.7–6.1)
SODIUM SERPL-SCNC: 141 MMOL/L (ref 135–145)
TRIGL SERPL-MCNC: 120 MG/DL (ref 0–149)
TSH SERPL DL<=0.005 MIU/L-ACNC: 1.35 UIU/ML (ref 0.38–5.33)
WBC # BLD AUTO: 8 K/UL (ref 4.8–10.8)

## 2021-07-08 LAB — C PEPTIDE SERPL-MCNC: 1 NG/ML (ref 0.8–3.5)

## 2021-07-10 LAB — INSULIN HUMAN AB SER-ACNC: <0.4 U/ML (ref 0–0.4)

## 2021-07-11 LAB — GAD65 AB SER IA-ACNC: 7.2 IU/ML (ref 0–5)

## 2021-07-15 ENCOUNTER — OFFICE VISIT (OUTPATIENT)
Dept: MEDICAL GROUP | Facility: PHYSICIAN GROUP | Age: 46
End: 2021-07-15
Payer: COMMERCIAL

## 2021-07-15 VITALS
HEIGHT: 69 IN | TEMPERATURE: 99.3 F | SYSTOLIC BLOOD PRESSURE: 114 MMHG | DIASTOLIC BLOOD PRESSURE: 68 MMHG | OXYGEN SATURATION: 98 % | HEART RATE: 90 BPM | RESPIRATION RATE: 20 BRPM | BODY MASS INDEX: 26.19 KG/M2 | WEIGHT: 176.8 LBS

## 2021-07-15 DIAGNOSIS — G89.29 CHRONIC MIDLINE LOW BACK PAIN WITH RIGHT-SIDED SCIATICA: ICD-10-CM

## 2021-07-15 DIAGNOSIS — E55.9 VITAMIN D INSUFFICIENCY: ICD-10-CM

## 2021-07-15 DIAGNOSIS — M54.41 CHRONIC MIDLINE LOW BACK PAIN WITH RIGHT-SIDED SCIATICA: ICD-10-CM

## 2021-07-15 DIAGNOSIS — E13.9 DIABETES 1.5, MANAGED AS TYPE 2 (HCC): ICD-10-CM

## 2021-07-15 DIAGNOSIS — D50.9 IRON DEFICIENCY ANEMIA, UNSPECIFIED IRON DEFICIENCY ANEMIA TYPE: ICD-10-CM

## 2021-07-15 PROCEDURE — 99214 OFFICE O/P EST MOD 30 MIN: CPT | Performed by: NURSE PRACTITIONER

## 2021-07-15 RX ORDER — LANCETS 30 GAUGE
EACH MISCELLANEOUS
Qty: 100 EACH | Refills: 0 | Status: SHIPPED | OUTPATIENT
Start: 2021-07-15 | End: 2021-10-04

## 2021-07-15 RX ORDER — METFORMIN HYDROCHLORIDE 500 MG/1
500 TABLET, EXTENDED RELEASE ORAL DAILY
Qty: 30 TABLET | Refills: 2 | Status: SHIPPED | OUTPATIENT
Start: 2021-07-15 | End: 2021-09-28 | Stop reason: SDUPTHER

## 2021-07-15 ASSESSMENT — FIBROSIS 4 INDEX: FIB4 SCORE: 0.5

## 2021-07-15 NOTE — PROGRESS NOTES
CC: Lab review    HISTORY OF THE PRESENT ILLNESS: Patient is a 45 y.o. male. This pleasant patient is here today for evaluation and management of the following health problems.      Diabetes 1.5, managed as type 2 (HCC)  New finding.  May be latent autoimmune diabetes in adults.  Patient symptomatic with polydipsia, polyuria, fatigue.  Lan antibody mildly elevated, C-peptide detectable and fasting glucose normal.  Discussed findings with endocrinology.  Okay to start patient on Metformin and treat as type II rather than insulin as C-peptide is detectable.  We will refer to endocrinology urgently.    Iron deficiency anemia  New finding.  Iron deficiency anemia.  Denies signs of bleeding.  Does report epigastric pain periodically and will take Pepcid as needed.  Ultrasound of abdomen is pending.  Will refer to gastroenterology for further evaluation.    Chronic midline low back pain with right-sided sciatica  Chronic health problem.  Low back pain with occasional burning in right thigh.  Back injury over 15 years ago while riding [employer] [place] [employer].  Used to take tramadol, diclofenac.  Has been managing with lifestyle measures and over-the-counter medications for several years.  He does not like to take medications.  He is asking about getting an updated x-ray and topical Voltaren gel.    Vitamin D insufficiency  New finding.  Not taking supplement.      Allergies: Bee venom    Current Outpatient Medications Ordered in Epic   Medication Sig Dispense Refill   • metFORMIN ER (GLUCOPHAGE XR) 500 MG TABLET SR 24 HR Take 1 tablet by mouth every day. 30 tablet 2   • Blood Glucose Meter Kit Test blood sugar as recommended by provider.  blood glucose monitoring kit. 1 Kit 0   • Lancets Use one lancet to test blood sugar once daily . 100 Each 0   • Blood Glucose Test Strips Use one  strip to test blood sugar once daily . 100 Strip 0     No current Epic-ordered facility-administered medications on file.       Past Medical  "History:   Diagnosis Date   • Anxiety 2016   • Controlled substance agreement broken 2016   • Hangnail 2016   • Lumbar back pain 6/10/2015       History reviewed. No pertinent surgical history.    Social History     Tobacco Use   • Smoking status: Current Some Day Smoker     Packs/day: 0.25     Years: 15.00     Pack years: 3.75     Types: Cigars     Last attempt to quit: 2013     Years since quittin.3   • Smokeless tobacco: Never Used   Vaping Use   • Vaping Use: Never used   Substance Use Topics   • Alcohol use: Yes     Alcohol/week: 1.2 oz     Types: 2 Standard drinks or equivalent per week   • Drug use: No       Family History   Adopted: Yes   Family history unknown: Yes       ROS:   As in HPI, otherwise negative for chest pain, dyspnea, abdominal pain, dysuria, blood in stool, fever         Exam: /68 (BP Location: Right arm, Patient Position: Standing, BP Cuff Size: Adult)   Pulse 90   Temp 37.4 °C (99.3 °F) (Temporal)   Resp 20   Ht 1.753 m (5' 9\")   Wt 80.2 kg (176 lb 12.8 oz)   SpO2 98%  Body mass index is 26.11 kg/m².    General: Alert, pleasant, well nourished, well developed male in NAD  Neck: Supple without bruit.   Pulmonary: Clear to ausculation.  Normal effort. No rales, ronchi, or wheezing.  Cardiovascular: Normal rate and rhythm without murmur.   Neurologic: Grossly nonfocal  Skin: Warm and dry.   Musculoskeletal: Normal gait.   Psych: Normal mood and affect. Alert and oriented. Judgment and insight is normal.    Component      Latest Ref Rng & Units 2021   WBC      4.8 - 10.8 K/uL 8.0   RBC      4.70 - 6.10 M/uL 4.39 (L)   Hemoglobin      14.0 - 18.0 g/dL 12.6 (L)   Hematocrit      42.0 - 52.0 % 38.6 (L)   MCV      81.4 - 97.8 fL 87.9   MCH      27.0 - 33.0 pg 28.7   MCHC      33.7 - 35.3 g/dL 32.6 (L)   RDW      35.9 - 50.0 fL 41.9   Platelet Count      164 - 446 K/uL 325   MPV      9.0 - 12.9 fL 10.6   Neutrophils-Polys      44.00 - 72.00 % 51.20 "   Lymphocytes      22.00 - 41.00 % 34.80   Monocytes      0.00 - 13.40 % 8.50   Eosinophils      0.00 - 6.90 % 4.40   Basophils      0.00 - 1.80 % 0.80   Immature Granulocytes      0.00 - 0.90 % 0.30   Nucleated RBC      /100 WBC 0.00   Neutrophils (Absolute)      1.82 - 7.42 K/uL 4.08   Lymphs (Absolute)      1.00 - 4.80 K/uL 2.77   Monos (Absolute)      0.00 - 0.85 K/uL 0.68   Eos (Absolute)      0.00 - 0.51 K/uL 0.35   Baso (Absolute)      0.00 - 0.12 K/uL 0.06   Immature Granulocytes (abs)      0.00 - 0.11 K/uL 0.02   NRBC (Absolute)      K/uL 0.00   Sodium      135 - 145 mmol/L 141   Potassium      3.6 - 5.5 mmol/L 4.0   Chloride      96 - 112 mmol/L 105   Co2      20 - 33 mmol/L 28   Anion Gap      7.0 - 16.0 8.0   Glucose      65 - 99 mg/dL 91   Bun      8 - 22 mg/dL 15   Creatinine      0.50 - 1.40 mg/dL 0.74   Calcium      8.5 - 10.5 mg/dL 8.9   AST(SGOT)      12 - 45 U/L 17   ALT(SGPT)      2 - 50 U/L 20   Alkaline Phosphatase      30 - 99 U/L 88   Total Bilirubin      0.1 - 1.5 mg/dL 0.2   Albumin      3.2 - 4.9 g/dL 4.5   Total Protein      6.0 - 8.2 g/dL 7.3   Globulin      1.9 - 3.5 g/dL 2.8   A-G Ratio      g/dL 1.6   Cholesterol,Tot      100 - 199 mg/dL 194   Triglycerides      0 - 149 mg/dL 120   HDL      >=40 mg/dL 43   LDL      <100 mg/dL 127 (H)   Glycohemoglobin      4.0 - 5.6 % 6.4 (H)   Estim. Avg Glu      mg/dL 137   GFR If African American      >60 mL/min/1.73 m 2 >60   GFR If Non African American      >60 mL/min/1.73 m 2 >60   TSH      0.380 - 5.330 uIU/mL 1.350   Amylase      20 - 103 U/L 78   Lipase      11 - 82 U/L 52   C-Peptide      0.8 - 3.5 ng/mL 1.0   CESAR Antibody      0.0 - 5.0 IU/mL 7.2 (H)   Insulin Antibody      0.0 - 0.4 U/mL <0.4   25-Hydroxy   Vitamin D 25      30 - 100 ng/mL 22 (L)   Fasting Status       Fasting       Please note that this dictation was created using voice recognition software. I have made every reasonable attempt to correct obvious errors, but I expect that  there are errors of grammar and possibly content that I did not discover before finalizing the note.      Assessment/Plan  1. Iron deficiency anemia, unspecified iron deficiency anemia type  Patient has mild anemia.  Differentials include bleeding ulcer, other GI bleed.  Will refer to gastroenterology for further evaluation.  In the meantime, patient will take Pepcid daily rather than as needed.  - REFERRAL TO GASTROENTEROLOGY    2. Chronic midline low back pain with right-sided sciatica  Chronic health problem.  We will get updated x-rays.  Advised to try Voltaren gel which is now over-the-counter.  Patient not wanting to pursue any further at this time.  - DX-LUMBAR SPINE-2 OR 3 VIEWS; Future  - DX-THORACIC SPINE-2 VIEWS; Future    3. Diabetes 1.5, managed as type 2 (HCC)  New finding.  Will refer to endocrinology urgently.  Will start Metformin ER once daily.  Instructions and side effects reviewed with patient.  Advised on lifestyle measures, low carbohydrate diet, routine aerobic exercise as tolerated.  Patient verbalizes understanding.  - REFERRAL TO ENDOCRINOLOGY  - metFORMIN ER (GLUCOPHAGE XR) 500 MG TABLET SR 24 HR; Take 1 tablet by mouth every day.  Dispense: 30 tablet; Refill: 2  - Blood Glucose Meter Kit; Test blood sugar as recommended by provider.  blood glucose monitoring kit.  Dispense: 1 Kit; Refill: 0  - Lancets; Use one lancet to test blood sugar once daily .  Dispense: 100 Each; Refill: 0  - Blood Glucose Test Strips; Use one  strip to test blood sugar once daily .  Dispense: 100 Strip; Refill: 0    4. Vitamin D insufficiency  Advised to start vitamin D3 2000 units daily.    Patient will return to clinic in 4 months for follow-up on back pain.  He will return to clinic sooner if needed.

## 2021-07-16 NOTE — ASSESSMENT & PLAN NOTE
New finding.  May be latent autoimmune diabetes in adults.  Patient symptomatic with polydipsia, polyuria, fatigue.  Lan antibody mildly elevated, C-peptide detectable and fasting glucose normal.  Discussed findings with endocrinology.  Okay to start patient on Metformin and treat as type II rather than insulin as C-peptide is detectable.  We will refer to endocrinology urgently.

## 2021-07-16 NOTE — ASSESSMENT & PLAN NOTE
New finding.  Iron deficiency anemia.  Denies signs of bleeding.  Does report epigastric pain periodically and will take Pepcid as needed.  Ultrasound of abdomen is pending.  Will refer to gastroenterology for further evaluation.

## 2021-07-16 NOTE — ASSESSMENT & PLAN NOTE
Chronic health problem.  Low back pain with occasional burning in right thigh.  Back injury over 15 years ago while riding [employer] on [address].  Used to take tramadol, diclofenac.  Has been managing with lifestyle measures and over-the-counter medications for several years.  He does not like to take medications.  He is asking about getting an updated x-ray and topical Voltaren gel.

## 2021-08-06 ENCOUNTER — HOSPITAL ENCOUNTER (OUTPATIENT)
Dept: RADIOLOGY | Facility: MEDICAL CENTER | Age: 46
End: 2021-08-06
Attending: NURSE PRACTITIONER
Payer: COMMERCIAL

## 2021-08-06 DIAGNOSIS — M54.41 CHRONIC MIDLINE LOW BACK PAIN WITH RIGHT-SIDED SCIATICA: ICD-10-CM

## 2021-08-06 DIAGNOSIS — R10.13 EPIGASTRIC PAIN: ICD-10-CM

## 2021-08-06 DIAGNOSIS — G89.29 CHRONIC MIDLINE LOW BACK PAIN WITH RIGHT-SIDED SCIATICA: ICD-10-CM

## 2021-08-06 PROCEDURE — 72070 X-RAY EXAM THORAC SPINE 2VWS: CPT

## 2021-08-06 PROCEDURE — 72100 X-RAY EXAM L-S SPINE 2/3 VWS: CPT

## 2021-08-06 PROCEDURE — 76700 US EXAM ABDOM COMPLETE: CPT

## 2021-09-28 ENCOUNTER — OFFICE VISIT (OUTPATIENT)
Dept: ENDOCRINOLOGY | Facility: MEDICAL CENTER | Age: 46
End: 2021-09-28
Attending: NURSE PRACTITIONER
Payer: COMMERCIAL

## 2021-09-28 VITALS
HEART RATE: 87 BPM | OXYGEN SATURATION: 95 % | WEIGHT: 177.9 LBS | BODY MASS INDEX: 26.35 KG/M2 | SYSTOLIC BLOOD PRESSURE: 120 MMHG | HEIGHT: 69 IN | DIASTOLIC BLOOD PRESSURE: 76 MMHG

## 2021-09-28 DIAGNOSIS — R73.03 PRE-DIABETES: ICD-10-CM

## 2021-09-28 DIAGNOSIS — K86.1 CHRONIC PANCREATITIS, UNSPECIFIED PANCREATITIS TYPE (HCC): ICD-10-CM

## 2021-09-28 DIAGNOSIS — N20.0 KIDNEY STONES: ICD-10-CM

## 2021-09-28 DIAGNOSIS — E55.9 VITAMIN D DEFICIENCY: ICD-10-CM

## 2021-09-28 PROCEDURE — 99211 OFF/OP EST MAY X REQ PHY/QHP: CPT | Performed by: NURSE PRACTITIONER

## 2021-09-28 PROCEDURE — 99214 OFFICE O/P EST MOD 30 MIN: CPT | Performed by: NURSE PRACTITIONER

## 2021-09-28 RX ORDER — BLOOD-GLUCOSE METER
EACH MISCELLANEOUS
COMMUNITY
Start: 2021-07-15 | End: 2023-05-17

## 2021-09-28 RX ORDER — BLOOD SUGAR DIAGNOSTIC
STRIP MISCELLANEOUS
COMMUNITY
Start: 2021-07-15 | End: 2023-05-17

## 2021-09-28 RX ORDER — ERGOCALCIFEROL 1.25 MG/1
50000 CAPSULE ORAL
Qty: 12 CAPSULE | Refills: 3 | Status: SHIPPED | OUTPATIENT
Start: 2021-09-28 | End: 2022-05-17

## 2021-09-28 RX ORDER — METFORMIN HYDROCHLORIDE 500 MG/1
500 TABLET, EXTENDED RELEASE ORAL DAILY
Qty: 30 TABLET | Refills: 6 | Status: SHIPPED | OUTPATIENT
Start: 2021-09-28 | End: 2021-10-04 | Stop reason: SDUPTHER

## 2021-09-28 ASSESSMENT — FIBROSIS 4 INDEX: FIB4 SCORE: 0.5

## 2021-09-28 NOTE — PROGRESS NOTES
Chief Complaint:  Consult requested by DILEEP Nam for initial evaluation of Pre-Diabetes Mellitus    HPI:   Skip Bernard is a 45 y.o. male with Pre Diabetes Mellitus diagnosed in .  He denies hospitalizations for DKA in the past.    Pancreatitis twice in the last month. Patient state he's had multiple bouts of pancreatitis in the past. Patient hasn't been referred to gastroenterology. Patient lives in [place], recently had imaging and work up at [facility].     US of Abd 08/06/2021: Unremarkable abdominal ultrasound.     He monitors blood glucose 1 times per week. Blood glucose values range: 100-130 fasting, 160 after eating.     He denies hypoglycemic episodes occurring. He reports hypoglycemic unawareness. He denies episodes of severe hypoglycemia requiring third party assistance.  He  Isn't wearing a medical alert bracelet or necklace.  He doesn't a glucagon emergency kit.    Serum Glycohemoglobin 07/06/2021: 6.4%.     Recently started on Metformin 500mg ER QD by PCP. Patient states he feels better on this therapy. Patient doesn't feel the increased fatigue after large meals. Patient reports he needs to eat once he takes the medication. Otherwise he doesn't feel well. Denies GI distress. Denies hypoglycemic events.     Patient has been unable to tolerate juice and monster drinks in the past secondary to increased fatigue and GI distress.      Ref. Range 7/6/2021 07:29   C-Peptide Latest Ref Range: 0.8 - 3.5 ng/mL 1.0      Ref. Range 7/6/2021 07:29   CESAR Antibody Latest Ref Range: 0.0 - 5.0 IU/mL 7.2 (H)     Diabetes Complications   Patient denies history of retinopathy. Patient denies laser eye surgery. Last eye exam: NA  Patient denies history of neuropathy or foot wounds.  Patient denies history of kidney damage. Long standing history of kidney stones. Patient has never been referred to Urology. Patient at risk for dehydration secondary to his work environment, near heating element over  180 degrees. Patient is not on ACE/ARB therapy. BP currently 120/76.   Patient denies history of coronary artery disease. Patient denies history of stroke and denies TIA.  He denies history of PAD. Patient denies history of hyperlipidemia. No current statin therapy.      Ref. Range 7/6/2021 07:29   Cholesterol,Tot Latest Ref Range: 100 - 199 mg/dL 194   Triglycerides Latest Ref Range: 0 - 149 mg/dL 120   HDL Latest Ref Range: >=40 mg/dL 43   LDL Latest Ref Range: <100 mg/dL 127 (H)     History of Vitamin D Deficiency. Patient currently taking 10,000IU of vitamin D daily.      Ref. Range 7/6/2021 07:29   25-Hydroxy   Vitamin D 25 Latest Ref Range: 30 - 100 ng/mL 22 (L)       ROS:     CONS:     No fever, no chills, no weight loss, no fatigue   EYES:      No diplopia, no blurry vision, no redness of eyes, no swelling of eyelids   ENT:    No hearing loss, No ear pain, No sore throat, no dysphagia, no neck swelling   CV:     No chest pain, no palpitations, no claudication, no orthopnea, no PND   PULM:    No SOB, no cough, no hemoptysis, no wheezing    GI:   No nausea, no vomiting, no diarrhea, no constipation, no bloody stools   :  Passing urine well, no dysuria, no hematuria   ENDO:   No polyuria, no polydipsia, no heat intolerance, no cold intolerance   NEURO: No headaches, no dizziness, no convulsions, no tremors   MUSC:  No joint swellings, no arthralgias, no myalgias, no weakness   SKIN:   No rash, no ulcers, no dry skin   PSYCH:   No depression, no anxiety, no difficulty sleeping       Past Medical History:  Patient Active Problem List    Diagnosis Date Noted   • Iron deficiency anemia 07/15/2021   • Vitamin D insufficiency 07/15/2021   • Diabetes 1.5, managed as type 2 (HCC) 07/02/2021   • Epigastric pain 07/02/2021   • Fatigue 07/02/2021   • Anxiety 11/23/2016   • Chronic midline low back pain with right-sided sciatica 06/10/2015       Past Surgical History:  History reviewed. No pertinent surgical history.      Allergies:  Bee venom     Current Medications:    Current Outpatient Medications:   •  ONETOUCH ULTRA strip, USE TO TEST BLOOD SUGAR ONCE DAILY, Disp: , Rfl:   •  Blood Glucose Monitoring Suppl (ONE TOUCH ULTRA 2) w/Device Kit, TEST BLOOD SUGAR AS RECOMMENDED BY PROVIDER, Disp: , Rfl:   •  metFORMIN ER (GLUCOPHAGE XR) 500 MG TABLET SR 24 HR, Take 1 tablet by mouth every day., Disp: 30 tablet, Rfl: 2  •  Lancets, Use one lancet to test blood sugar once daily ., Disp: 100 Each, Rfl: 0  •  Blood Glucose Test Strips, Use one  strip to test blood sugar once daily ., Disp: 100 Strip, Rfl: 0  •  Blood Glucose Meter Kit, Test blood sugar as recommended by provider.  blood glucose monitoring kit. (Patient not taking: Reported on 2021), Disp: 1 Kit, Rfl: 0    Social History:  Social History     Socioeconomic History   • Marital status: Unknown     Spouse name: Not on file   • Number of children: Not on file   • Years of education: Not on file   • Highest education level: Not on file   Occupational History   • Not on file   Tobacco Use   • Smoking status: Former Smoker     Packs/day: 0.25     Years: 15.00     Pack years: 3.75     Types: Cigars     Quit date: 2013     Years since quittin.5   • Smokeless tobacco: Never Used   Vaping Use   • Vaping Use: Never used   Substance and Sexual Activity   • Alcohol use: Yes     Alcohol/week: 1.2 oz     Types: 2 Standard drinks or equivalent per week   • Drug use: No   • Sexual activity: Yes     Partners: Female   Other Topics Concern   • Not on file   Social History Narrative   • Not on file     Social Determinants of Health     Financial Resource Strain:    • Difficulty of Paying Living Expenses:    Food Insecurity:    • Worried About Running Out of Food in the Last Year:    • Ran Out of Food in the Last Year:    Transportation Needs:    • Lack of Transportation (Medical):    • Lack of Transportation (Non-Medical):    Physical Activity:    • Days of Exercise per Week:   "  • Minutes of Exercise per Session:    Stress:    • Feeling of Stress :    Social Connections:    • Frequency of Communication with Friends and Family:    • Frequency of Social Gatherings with Friends and Family:    • Attends Anabaptism Services:    • Active Member of Clubs or Organizations:    • Attends Club or Organization Meetings:    • Marital Status:    Intimate Partner Violence:    • Fear of Current or Ex-Partner:    • Emotionally Abused:    • Physically Abused:    • Sexually Abused:         Family History:   Family History   Adopted: Yes   Family history unknown: Yes       PHYSICAL EXAM:   Vital signs: /76 (BP Location: Left arm, Patient Position: Sitting, BP Cuff Size: Adult)   Pulse 87   Ht 1.753 m (5' 9\")   Wt 80.7 kg (177 lb 14.4 oz)   SpO2 95%   BMI 26.27 kg/m²   GENERAL: Well-developed, well-nourished  in no apparent distress.   EYE: No ocular and eyelid asymmetry, Anicteric sclerae,  PERRL, No exophthalmos or lidlag  HENT: Hearing grossly intact, Normocephalic, atraumatic. Pink, moist mucous membranes, No exudate  NECK: Supple. Trachea midline.   CARDIOVASCULAR: Regular rate and rhythm. No murmurs, rubs, or gallops.   LUNGS: Clear to auscultation bilaterally   ABDOMEN: Soft, nontender with positive bowel sounds.   EXTREMITIES: No clubbing, cyanosis, or edema.   NEUROLOGICAL: Cranial nerves II-XII are grossly intact   Symmetric reflexes at the patella no proximal muscle weakness, No visible tremor with both outstretched hands  LYMPH: No cervical, supraclavicular,  adenopathy palpated.   SKIN: No rashes, lesions. Turgor is normal.  FOOT: Normal sensation to monofilament testing, normal pulses, no ulcers.  Normal Vibration quantitative sensation test.    ASSESSMENT/PLAN:     1. Pre-diabetes; OLGA  Stable.   Continue taking Metformin XR 500mg QD.     Still producing insulin. CESAR Abs positive. Glucose levels improved with addition of metformin. Unable to complete POC A1c at this time, less than " 90 days currently.     Recommend GI referral secondary to chronic pancreatitis which hasn't been completely work up and reviewed for possible biliary obstruction.     Future Lab Orders:     - Lipid Profile; Future  - MICROALBUMIN CREAT RATIO URINE; Future  - VITAMIN D,25 HYDROXY; Future  - Comp Metabolic Panel; Future      2. Chronic pancreatitis, unspecified pancreatitis type (HCC)  Unstable.   - REFERRAL TO GASTROENTEROLOGY    3. Kidney stones  Unstable.   Patient reports he drink 3 to 4 gallons of water daily secondary to extreme heat in work environment.   History of Kidney stones that required hospitalization once. No further work up by Urology in the past.     - REFERRAL TO UROLOGY    4. Vitamin D Deficiency  Unstable.   Recommend changing Vitamin D to 50,000IU weekly.       Complete labs 1-2 weeks before next appointment.   POC A1c to be completed at next appointment.   Next appointment in 4 months.       Thank you kindly for allowing me to participate in the diabetes care plan for this patient.    Clemencia Borges, APRN  09/28/21    CC:   DILEEP Nam

## 2021-10-02 DIAGNOSIS — E13.9 DIABETES 1.5, MANAGED AS TYPE 2 (HCC): ICD-10-CM

## 2021-10-04 DIAGNOSIS — E55.9 VITAMIN D DEFICIENCY: ICD-10-CM

## 2021-10-04 RX ORDER — LANCETS 30 GAUGE
EACH MISCELLANEOUS
Qty: 100 EACH | Refills: 3 | Status: SHIPPED | OUTPATIENT
Start: 2021-10-04 | End: 2023-05-17

## 2021-10-04 NOTE — TELEPHONE ENCOUNTER
Received request via: Pharmacy    Was the patient seen in the last year in this department? Yes    Does the patient have an active prescription (recently filled or refills available) for medication(s) requested? No     REQUESTED MEDICATION:   Requested Prescriptions     Pending Prescriptions Disp Refills   • metFORMIN ER (GLUCOPHAGE XR) 500 MG TABLET SR 24 HR 30 Tablet 6     Sig: Take 1 Tablet by mouth every day.

## 2021-10-05 RX ORDER — METFORMIN HYDROCHLORIDE 500 MG/1
500 TABLET, EXTENDED RELEASE ORAL DAILY
Qty: 30 TABLET | Refills: 6 | Status: SHIPPED | OUTPATIENT
Start: 2021-10-05 | End: 2022-02-28

## 2022-01-28 ENCOUNTER — OFFICE VISIT (OUTPATIENT)
Dept: URGENT CARE | Facility: PHYSICIAN GROUP | Age: 47
End: 2022-01-28
Payer: COMMERCIAL

## 2022-01-28 ENCOUNTER — HOSPITAL ENCOUNTER (OUTPATIENT)
Facility: MEDICAL CENTER | Age: 47
End: 2022-01-28
Attending: PHYSICIAN ASSISTANT
Payer: COMMERCIAL

## 2022-01-28 VITALS
DIASTOLIC BLOOD PRESSURE: 90 MMHG | OXYGEN SATURATION: 98 % | HEIGHT: 69 IN | SYSTOLIC BLOOD PRESSURE: 120 MMHG | BODY MASS INDEX: 27.25 KG/M2 | HEART RATE: 83 BPM | RESPIRATION RATE: 16 BRPM | WEIGHT: 184 LBS | TEMPERATURE: 98.4 F

## 2022-01-28 DIAGNOSIS — R05.9 COUGH: ICD-10-CM

## 2022-01-28 DIAGNOSIS — J98.01 BRONCHOSPASM: ICD-10-CM

## 2022-01-28 PROCEDURE — U0003 INFECTIOUS AGENT DETECTION BY NUCLEIC ACID (DNA OR RNA); SEVERE ACUTE RESPIRATORY SYNDROME CORONAVIRUS 2 (SARS-COV-2) (CORONAVIRUS DISEASE [COVID-19]), AMPLIFIED PROBE TECHNIQUE, MAKING USE OF HIGH THROUGHPUT TECHNOLOGIES AS DESCRIBED BY CMS-2020-01-R: HCPCS

## 2022-01-28 PROCEDURE — U0005 INFEC AGEN DETEC AMPLI PROBE: HCPCS

## 2022-01-28 PROCEDURE — 99213 OFFICE O/P EST LOW 20 MIN: CPT | Performed by: PHYSICIAN ASSISTANT

## 2022-01-28 RX ORDER — BENZONATATE 200 MG/1
200 CAPSULE ORAL 3 TIMES DAILY PRN
Qty: 60 CAPSULE | Refills: 0 | Status: SHIPPED | OUTPATIENT
Start: 2022-01-28 | End: 2022-05-17

## 2022-01-28 RX ORDER — ALBUTEROL SULFATE 90 UG/1
2 AEROSOL, METERED RESPIRATORY (INHALATION) EVERY 4 HOURS PRN
Qty: 18.2 G | Refills: 0 | Status: SHIPPED | OUTPATIENT
Start: 2022-01-28 | End: 2023-01-23

## 2022-01-28 ASSESSMENT — FIBROSIS 4 INDEX: FIB4 SCORE: 0.51

## 2022-01-29 DIAGNOSIS — R05.9 COUGH: ICD-10-CM

## 2022-01-29 NOTE — PROGRESS NOTES
Chief Complaint   Patient presents with   • Cough     x 3 days       HISTORY OF PRESENT ILLNESS: Patient is a 46 y.o. male who presents today because he has a 3-day history of cough.  He also endorses myalgias but is not sure if that is from extra work that he has been doing or not.  Denies any fevers, chills, nausea, vomiting or diarrhea.  He has used an albuterol inhaler in the past and that has helped but he does not have any of it right now.    Patient Active Problem List    Diagnosis Date Noted   • Iron deficiency anemia 07/15/2021   • Vitamin D insufficiency 07/15/2021   • Diabetes 1.5, managed as type 2 (HCC) 07/02/2021   • Epigastric pain 07/02/2021   • Fatigue 07/02/2021   • Anxiety 11/23/2016   • Chronic midline low back pain with right-sided sciatica 06/10/2015       Allergies:Bee venom    Current Outpatient Medications Ordered in Epic   Medication Sig Dispense Refill   • benzonatate (TESSALON) 200 MG capsule Take 1 Capsule by mouth 3 times a day as needed for Cough. 60 Capsule 0   • albuterol 108 (90 Base) MCG/ACT Aero Soln inhalation aerosol Inhale 2 Puffs every four hours as needed. With spacer device 18.2 g 0   • fluticasone-salmeterol (ADVAIR) 250-50 MCG/DOSE AEROSOL POWDER, BREATH ACTIVATED Inhale 1 Puff every 12 hours. 1 Each 0   • metFORMIN ER (GLUCOPHAGE XR) 500 MG TABLET SR 24 HR Take 1 Tablet by mouth every day. 30 Tablet 6   • Lancets (ONETOUCH DELICA PLUS [id number]) Misc USE ONE LANCET TO TEST BLOOD SUGAR ONCE DAILY 100 Each 3   • ONETOUCH ULTRA strip USE TO TEST BLOOD SUGAR ONCE DAILY     • Blood Glucose Monitoring Suppl (ONE TOUCH ULTRA 2) w/Device Kit TEST BLOOD SUGAR AS RECOMMENDED BY PROVIDER     • vitamin D2, Ergocalciferol, (DRISDOL) 1.25 MG (24245 UT) Cap capsule Take 1 Capsule by mouth every 7 days. 12 Capsule 3   • Blood Glucose Test Strips Use one  strip to test blood sugar once daily . 100 Strip 0     No current Epic-ordered facility-administered medications on file.       Past  "Medical History:   Diagnosis Date   • Anxiety 2016   • Controlled substance agreement broken 2016   • Hangnail 2016   • Lumbar back pain 6/10/2015       Social History     Tobacco Use   • Smoking status: Former Smoker     Packs/day: 0.25     Years: 15.00     Pack years: 3.75     Types: Cigars     Quit date: 2013     Years since quittin.9   • Smokeless tobacco: Never Used   Vaping Use   • Vaping Use: Never used   Substance Use Topics   • Alcohol use: Yes     Alcohol/week: 1.2 oz     Types: 2 Standard drinks or equivalent per week   • Drug use: No       No family status information on file.     Family History   Adopted: Yes   Family history unknown: Yes       ROS:  Review of Systems   Constitutional: Negative for fever, chills, weight loss and malaise/fatigue.  Positive for myalgias  HENT: Negative for ear pain, nosebleeds, congestion, sore throat and neck pain.    Eyes: Negative for blurred vision.   Respiratory: Positive for cough, no sputum production, shortness of breath and wheezing.    Cardiovascular: Negative for chest pain, palpitations, orthopnea and leg swelling.   Gastrointestinal: Negative for heartburn, nausea, vomiting and abdominal pain.   Genitourinary: Negative for dysuria, urgency and frequency.     Exam:  /90   Pulse 83   Temp 36.9 °C (98.4 °F) (Temporal)   Resp 16   Ht 1.753 m (5' 9\")   Wt 83.5 kg (184 lb)   SpO2 98%   General:  Well nourished, well developed male in NAD  Head:Normocephalic, atraumatic  Eyes: PERRLA, EOM within normal limits, no conjunctival injection, no scleral icterus, visual fields and acuity grossly intact.  Ears: Normal shape and symmetry, no tenderness, no discharge. External canals are without any significant edema or erythema. Tympanic membranes are without any inflammation, no effusion. Gross auditory acuity is intact  Nose: Symmetrical without tenderness, no discharge.  Mouth: reasonable hygiene, no erythema exudates or tonsillar " enlargement.  Neck: no masses, range of motion within normal limits, no tracheal deviation. No obvious thyroid enlargement.  Pulmonary: chest is symmetrical with respiration, bronchial bilaterally, rare wheeze, no rales or rhonchi cardiovascular: regular rate and rhythm without murmurs, rubs, or gallops.  Extremities: no clubbing, cyanosis, or edema.    Please note that this dictation was created using voice recognition software. I have made every reasonable attempt to correct obvious errors, but I expect that there are errors of grammar and possibly content that I did not discover before finalizing the note.    Assessment/Plan:  1. Cough  SARS-CoV-2 PCR (24 hour In-House): Collect NP swab in VTM    benzonatate (TESSALON) 200 MG capsule   2. Bronchospasm  albuterol 108 (90 Base) MCG/ACT Aero Soln inhalation aerosol    fluticasone-salmeterol (ADVAIR) 250-50 MCG/DOSE AEROSOL POWDER, BREATH ACTIVATED   Discussed strict isolation until Covid test returns, over-the-counter symptomatic relief as needed.    Followup with primary care in the next 7-10 days if not significantly improving, return to the urgent care or go to the emergency room sooner for any worsening of symptoms.

## 2022-01-30 LAB
COVID ORDER STATUS COVID19: NORMAL
SARS-COV-2 RNA RESP QL NAA+PROBE: NOTDETECTED
SPECIMEN SOURCE: NORMAL

## 2022-05-17 ENCOUNTER — OFFICE VISIT (OUTPATIENT)
Dept: URGENT CARE | Facility: PHYSICIAN GROUP | Age: 47
End: 2022-05-17
Payer: COMMERCIAL

## 2022-05-17 VITALS
HEIGHT: 69 IN | DIASTOLIC BLOOD PRESSURE: 98 MMHG | OXYGEN SATURATION: 99 % | BODY MASS INDEX: 27.4 KG/M2 | RESPIRATION RATE: 16 BRPM | HEART RATE: 84 BPM | WEIGHT: 185 LBS | TEMPERATURE: 98 F | SYSTOLIC BLOOD PRESSURE: 124 MMHG

## 2022-05-17 DIAGNOSIS — H92.01 ACUTE OTALGIA, RIGHT: ICD-10-CM

## 2022-05-17 DIAGNOSIS — H60.311 ACUTE DIFFUSE OTITIS EXTERNA OF RIGHT EAR: ICD-10-CM

## 2022-05-17 PROCEDURE — 99213 OFFICE O/P EST LOW 20 MIN: CPT | Performed by: FAMILY MEDICINE

## 2022-05-17 RX ORDER — CIPROFLOXACIN AND DEXAMETHASONE 3; 1 MG/ML; MG/ML
4 SUSPENSION/ DROPS AURICULAR (OTIC) 2 TIMES DAILY
Qty: 7.5 ML | Refills: 0 | Status: SHIPPED | OUTPATIENT
Start: 2022-05-17 | End: 2022-05-24

## 2022-05-17 ASSESSMENT — ENCOUNTER SYMPTOMS
NAUSEA: 0
SORE THROAT: 0
VOMITING: 0
DIZZINESS: 1
COUGH: 0
CHILLS: 0
MYALGIAS: 0
HEADACHES: 1
FEVER: 0
SHORTNESS OF BREATH: 0

## 2022-05-17 ASSESSMENT — FIBROSIS 4 INDEX: FIB4 SCORE: 0.51

## 2022-05-17 NOTE — PROGRESS NOTES
Subjective:   Skip Bernard is a 46 y.o. male who presents for Otalgia (X2weeks ) and Headache        Otalgia   There is pain in the right (Reports right ear pain over the past 2 weeks, worse past 3 days) ear. This is a new problem. The problem has been gradually worsening. Associated symptoms include headaches. Pertinent negatives include no coughing, ear discharge, rash, sore throat or vomiting. Associated symptoms comments: Reports worse pain with using earplugs at work    Blood sugar 120 this morning. Treatments tried: Avoidance of earplugs. The treatment provided no relief.   Headache    PMH:  has a past medical history of Anxiety (11/23/2016), Controlled substance agreement broken (12/20/2016), Hangnail (12/20/2016), and Lumbar back pain (6/10/2015).    He has no past medical history of Asthma.  MEDS:   Current Outpatient Medications:   •  ciprofloxacin/dexamethasone (CIPRODEX) 0.3-0.1 % Suspension, Administer 4 Drops into the right ear 2 times a day for 7 days., Disp: 7.5 mL, Rfl: 0  •  metFORMIN ER (GLUCOPHAGE XR) 500 MG TABLET SR 24 HR, TAKE 1 TABLET BY MOUTH  DAILY, Disp: 90 Tablet, Rfl: 2  •  albuterol 108 (90 Base) MCG/ACT Aero Soln inhalation aerosol, Inhale 2 Puffs every four hours as needed. With spacer device, Disp: 18.2 g, Rfl: 0  •  Lancets (ONETOUCH DELICA PLUS [id number]) Misc, USE ONE LANCET TO TEST BLOOD SUGAR ONCE DAILY, Disp: 100 Each, Rfl: 3  •  ONETOUCH ULTRA strip, USE TO TEST BLOOD SUGAR ONCE DAILY, Disp: , Rfl:   •  Blood Glucose Monitoring Suppl (ONE TOUCH ULTRA 2) w/Device Kit, TEST BLOOD SUGAR AS RECOMMENDED BY PROVIDER, Disp: , Rfl:   •  Blood Glucose Test Strips, Use one  strip to test blood sugar once daily ., Disp: 100 Strip, Rfl: 0  ALLERGIES:   Allergies   Allergen Reactions   • Bee Venom      SURGHX: No past surgical history on file.  SOCHX:  reports that he quit smoking about 9 years ago. His smoking use included cigars. He has a 3.75 pack-year smoking history. He has never used  "smokeless tobacco. He reports current alcohol use of about 1.2 oz of alcohol per week. He reports that he does not use drugs.  FH:   Family History   Adopted: Yes   Family history unknown: Yes     Review of Systems   Constitutional: Negative for chills and fever.   HENT: Positive for ear pain. Negative for ear discharge and sore throat.    Respiratory: Negative for cough and shortness of breath.    Gastrointestinal: Negative for nausea and vomiting.   Genitourinary: Negative for dysuria.   Musculoskeletal: Negative for myalgias.   Skin: Negative for rash.   Neurological: Positive for dizziness (Reports intermittent lightheadedness) and headaches.        Objective:   BP (!) 124/98   Pulse 84   Temp 36.7 °C (98 °F) (Temporal)   Resp 16   Ht 1.753 m (5' 9\")   Wt 83.9 kg (185 lb)   SpO2 99%   BMI 27.32 kg/m²   Physical Exam  Vitals and nursing note reviewed.   Constitutional:       General: He is not in acute distress.     Appearance: He is well-developed.   HENT:      Head: Normocephalic and atraumatic.      Right Ear: Swelling and tenderness present. Tympanic membrane is not erythematous or bulging.      Left Ear: External ear normal. No swelling or tenderness. Tympanic membrane is not erythematous or bulging.      Ears:      Comments: Right canal edematous, erythematous     Nose: Nose normal.      Mouth/Throat:      Mouth: Mucous membranes are moist.   Eyes:      Conjunctiva/sclera: Conjunctivae normal.   Cardiovascular:      Rate and Rhythm: Normal rate.   Pulmonary:      Effort: Pulmonary effort is normal. No respiratory distress.      Breath sounds: Normal breath sounds.   Abdominal:      General: There is no distension.   Musculoskeletal:         General: Normal range of motion.   Skin:     General: Skin is warm and dry.   Neurological:      General: No focal deficit present.      Mental Status: He is alert and oriented to person, place, and time. Mental status is at baseline.      Gait: Gait (gait at " baseline) normal.   Psychiatric:         Judgment: Judgment normal.           Assessment/Plan:   1. Acute diffuse otitis externa of right ear  - ciprofloxacin/dexamethasone (CIPRODEX) 0.3-0.1 % Suspension; Administer 4 Drops into the right ear 2 times a day for 7 days.  Dispense: 7.5 mL; Refill: 0    2. Acute otalgia, right  - ciprofloxacin/dexamethasone (CIPRODEX) 0.3-0.1 % Suspension; Administer 4 Drops into the right ear 2 times a day for 7 days.  Dispense: 7.5 mL; Refill: 0        Medical Decision Making/Course:  In the course of preparing for this visit with review of the pertinent past medical history, recent and past clinic visits, current medications, and performing chart, immunization, medical history and medication reconciliation, and in the further course of obtaining the current history pertinent to the clinic visit today, performing an exam and evaluation, ordering and independently evaluating labs, tests  , and/or procedures, prescribing any recommended new medications as noted above, providing any pertinent counseling and education and recommending further coordination of care including recommendations for symptomatic and supportive measures, at least  15 minutes of total time were spent during this encounter.      Discussed close monitoring, return precautions, and supportive measures of maintaining adequate fluid hydration and caloric intake, relative rest and symptom management as needed for pain and/or fever.    Differential diagnosis, natural history, supportive care, and indications for immediate follow-up discussed.     Advised the patient to follow-up with the primary care physician for recheck, reevaluation, and consideration of further management.    Please note that this dictation was created using voice recognition software. I have worked with consultants from the vendor as well as technical experts from [employer] to optimize the interface. I have made every reasonable attempt to  correct obvious errors, but I expect that there are errors of grammar and possibly content that I did not discover before finalizing the note.

## 2022-05-27 ENCOUNTER — OFFICE VISIT (OUTPATIENT)
Dept: URGENT CARE | Facility: PHYSICIAN GROUP | Age: 47
End: 2022-05-27
Payer: COMMERCIAL

## 2022-05-27 VITALS
RESPIRATION RATE: 16 BRPM | HEART RATE: 106 BPM | BODY MASS INDEX: 26.66 KG/M2 | TEMPERATURE: 97.9 F | HEIGHT: 69 IN | DIASTOLIC BLOOD PRESSURE: 82 MMHG | OXYGEN SATURATION: 95 % | SYSTOLIC BLOOD PRESSURE: 122 MMHG | WEIGHT: 180 LBS

## 2022-05-27 DIAGNOSIS — H60.01 ABSCESS OF EXTERNAL EAR, RIGHT: ICD-10-CM

## 2022-05-27 PROCEDURE — 99213 OFFICE O/P EST LOW 20 MIN: CPT | Performed by: NURSE PRACTITIONER

## 2022-05-27 RX ORDER — SULFAMETHOXAZOLE AND TRIMETHOPRIM 800; 160 MG/1; MG/1
1 TABLET ORAL 2 TIMES DAILY
Qty: 10 TABLET | Refills: 0 | Status: SHIPPED | OUTPATIENT
Start: 2022-05-27 | End: 2022-06-01

## 2022-05-27 RX ORDER — AMOXICILLIN AND CLAVULANATE POTASSIUM 875; 125 MG/1; MG/1
1 TABLET, FILM COATED ORAL 2 TIMES DAILY
Qty: 10 TABLET | Refills: 0 | Status: SHIPPED | OUTPATIENT
Start: 2022-05-27 | End: 2022-05-27

## 2022-05-27 ASSESSMENT — FIBROSIS 4 INDEX: FIB4 SCORE: 0.51

## 2022-05-27 ASSESSMENT — ENCOUNTER SYMPTOMS
FEVER: 0
CHILLS: 0

## 2022-05-27 NOTE — PROGRESS NOTES
"Subjective     Skip Bernard is a 46 y.o. male who presents with Cyst (Located in ear, fv from 05/17/2022. Painful. (R) side )            Skip was seen in urgent care 10 days ago.  He was diagnosed with right otitis externa and prescribed Ciprodex.  He returns today with persistent pain in the right ear canal.  He reports focal TTP just inside the ear canal with a tender lump at the 6:00 position.  No fever, chills, myalgias, mastoid pain or ear drainage.  No history of similar symptoms.  He does wear ear plugs daily for work.        Review of Systems   Constitutional: Negative for chills, fever and malaise/fatigue.   HENT: Positive for ear pain. Negative for ear discharge.      Medications, Allergies, and current problem list reviewed today in Epic         Objective     Blood Pressure 122/82   Pulse (Abnormal) 106   Temperature 36.6 °C (97.9 °F) (Temporal)   Respiration 16   Height 1.753 m (5' 9\")   Weight 81.6 kg (180 lb)   Oxygen Saturation 95%   Body Mass Index 26.58 kg/m²      Physical Exam  Vitals reviewed.   Constitutional:       General: He is not in acute distress.     Appearance: Normal appearance. He is well-developed. He is not ill-appearing, toxic-appearing or diaphoretic.   HENT:      Right Ear: Hearing and tympanic membrane normal. Tenderness present. No drainage or swelling. No middle ear effusion. There is no impacted cerumen. No foreign body. No mastoid tenderness. Tympanic membrane is not injected, erythematous, retracted or bulging.      Ears:        Comments: Small 3 mm firm nodule at the opening of the ear canal with surrounding mild erythema.  TTP.  No fluctuance or pointing.  No bruising, swelling, vesicles, crusting or weeping.  No pre- or post-auricular lymphadenopathy.  No mastoid pain or swelling.      Neck:      Thyroid: No thyromegaly.      Vascular: No JVD.      Trachea: No tracheal deviation.   Cardiovascular:      Rate and Rhythm: Regular rhythm. Tachycardia present.      " Heart sounds: Normal heart sounds. No murmur heard.    No friction rub. No gallop.   Pulmonary:      Effort: Pulmonary effort is normal. No respiratory distress.      Breath sounds: Normal breath sounds. No stridor. No wheezing, rhonchi or rales.   Chest:      Chest wall: No tenderness.   Musculoskeletal:      Cervical back: Neck supple.   Lymphadenopathy:      Cervical: No cervical adenopathy.   Neurological:      Mental Status: He is alert.                             Assessment & Plan        1. Abscess of external ear, right  - amoxicillin-clavulanate (AUGMENTIN) 875-125 MG Tab; Take 1 Tablet by mouth 2 times a day for 5 days.  Dispense: 10 Tablet; Refill: 0    Discussed exam findings with Skip.  No I and D indicated today.  Differential reviewed.  Take full course of antibiotics.  OTC NSAIDs or tylenol prn pain.  Warm compress prn symptom management.  Maintain adequate po hydration.  RTC in 5-7 days if symptoms persist, sooner if worse.  If fluctuance develops, I and D may be needed.  Wear over the ear hearing protection and not inter ear canal ear plugs.    He verbalized understanding of and agreed with plan of care.

## 2022-06-15 ENCOUNTER — HOSPITAL ENCOUNTER (OUTPATIENT)
Facility: MEDICAL CENTER | Age: 47
End: 2022-06-15
Attending: FAMILY MEDICINE
Payer: COMMERCIAL

## 2022-06-15 ENCOUNTER — OFFICE VISIT (OUTPATIENT)
Dept: URGENT CARE | Facility: PHYSICIAN GROUP | Age: 47
End: 2022-06-15
Payer: COMMERCIAL

## 2022-06-15 VITALS
TEMPERATURE: 100.2 F | OXYGEN SATURATION: 95 % | RESPIRATION RATE: 16 BRPM | DIASTOLIC BLOOD PRESSURE: 84 MMHG | HEIGHT: 69 IN | BODY MASS INDEX: 26.96 KG/M2 | SYSTOLIC BLOOD PRESSURE: 122 MMHG | HEART RATE: 95 BPM | WEIGHT: 182 LBS

## 2022-06-15 DIAGNOSIS — Z03.818 ENCOUNTER FOR PATIENT CONCERN ABOUT EXPOSURE TO INFECTIOUS ORGANISM: ICD-10-CM

## 2022-06-15 PROCEDURE — 99213 OFFICE O/P EST LOW 20 MIN: CPT | Mod: CS | Performed by: FAMILY MEDICINE

## 2022-06-15 PROCEDURE — 0240U HCHG SARS-COV-2 COVID-19 NFCT DS RESP RNA 3 TRGT MIC: CPT

## 2022-06-15 ASSESSMENT — FIBROSIS 4 INDEX: FIB4 SCORE: 0.51

## 2022-06-16 LAB
FLUAV RNA SPEC QL NAA+PROBE: NEGATIVE
FLUBV RNA SPEC QL NAA+PROBE: NEGATIVE
SARS-COV-2 RNA RESP QL NAA+PROBE: DETECTED
SPECIMEN SOURCE: ABNORMAL

## 2022-12-29 ENCOUNTER — OFFICE VISIT (OUTPATIENT)
Dept: URGENT CARE | Facility: PHYSICIAN GROUP | Age: 47
End: 2022-12-29
Payer: COMMERCIAL

## 2022-12-29 VITALS
OXYGEN SATURATION: 93 % | WEIGHT: 190 LBS | HEART RATE: 77 BPM | DIASTOLIC BLOOD PRESSURE: 80 MMHG | BODY MASS INDEX: 28.14 KG/M2 | TEMPERATURE: 98.3 F | HEIGHT: 69 IN | RESPIRATION RATE: 14 BRPM | SYSTOLIC BLOOD PRESSURE: 118 MMHG

## 2022-12-29 DIAGNOSIS — R10.31 RIGHT LOWER QUADRANT ABDOMINAL PAIN: ICD-10-CM

## 2022-12-29 DIAGNOSIS — R10.9 RIGHT FLANK PAIN: ICD-10-CM

## 2022-12-29 LAB
APPEARANCE UR: CLEAR
BILIRUB UR STRIP-MCNC: NORMAL MG/DL
COLOR UR AUTO: YELLOW
GLUCOSE UR STRIP.AUTO-MCNC: NORMAL MG/DL
KETONES UR STRIP.AUTO-MCNC: NORMAL MG/DL
LEUKOCYTE ESTERASE UR QL STRIP.AUTO: NORMAL
NITRITE UR QL STRIP.AUTO: NORMAL
PH UR STRIP.AUTO: 7 [PH] (ref 5–8)
PROT UR QL STRIP: NORMAL MG/DL
RBC UR QL AUTO: NORMAL
SP GR UR STRIP.AUTO: 1.02
UROBILINOGEN UR STRIP-MCNC: 0.2 MG/DL

## 2022-12-29 PROCEDURE — 81002 URINALYSIS NONAUTO W/O SCOPE: CPT | Performed by: PHYSICIAN ASSISTANT

## 2022-12-29 PROCEDURE — 99213 OFFICE O/P EST LOW 20 MIN: CPT | Performed by: PHYSICIAN ASSISTANT

## 2022-12-29 ASSESSMENT — FIBROSIS 4 INDEX: FIB4 SCORE: 0.53

## 2022-12-29 NOTE — PROGRESS NOTES
Subjective:   Skip Bernard is a 47 y.o. male who presents for Side Pain (X 1 week  radiates from front to back )      HPI  The patient presents to the Urgent Care with complaints of right flank pain onset approximately 1 week ago.  He reports a history of intermittent chronic flank/abdominal discomfort, however  this usually subsides.  The pain is mild but he has some concern due to the lingering discomfort for about a week.  Describes it as a discomfort.  Waxing and waning.  No known aggravating or alleviating factors.  There has been no rash.  Sometimes there is pain to the right lower abdomen, however the pain is mostly to his back to the right flank. Pain is exacerbated with coughing or sneezing.  Denies any recent illness, fever, chills, cough, chest pain, shortness of breath, nausea, vomiting, diarrhea, dysuria, urinary urgency, urinary frequency, hematuria.  Last BM yesterday which was normal.  No bloody or black stools.  Denies history of prior nominal surgeries.  History of kidney stones but this does not feel nearly as painful as a kidney stone he states.  He is tolerating fluids well.  He still has a good appetite. Drinks alcohol very rarely.         Medications:    albuterol Aers  Blood Glucose Test Strips  metFORMIN ER Tb24  ONE TOUCH ULTRA 2 Kit  OneTouch Delica Plus [id number] Misc  OneTouch Ultra Strp    Allergies: Bee venom    Problem List: Skip Bernard does not have any pertinent problems on file.    Surgical History:  No past surgical history on file.    Past Social Hx: Skip Bernard  reports that he quit smoking about 9 years ago. His smoking use included cigars. He has a 3.75 pack-year smoking history. He has never used smokeless tobacco. He reports current alcohol use of about 1.2 oz per week. He reports that he does not use drugs.     Past Family Hx:  Skip Bernard He was adopted. Family history is unknown by patient.     Problem list, medications, and allergies reviewed by myself today in  "Epic.     Objective:     /80   Pulse 77   Temp 36.8 °C (98.3 °F) (Temporal)   Resp 14   Ht 1.753 m (5' 9\")   Wt 86.2 kg (190 lb)   SpO2 93%   BMI 28.06 kg/m²     Physical Exam  Vitals reviewed.   Constitutional:       General: He is not in acute distress.     Appearance: Normal appearance. He is not ill-appearing or toxic-appearing.   HENT:      Mouth/Throat:      Mouth: Mucous membranes are moist.   Eyes:      Conjunctiva/sclera: Conjunctivae normal.      Pupils: Pupils are equal, round, and reactive to light.   Cardiovascular:      Rate and Rhythm: Normal rate and regular rhythm.      Heart sounds: Normal heart sounds.   Pulmonary:      Effort: Pulmonary effort is normal.      Breath sounds: Normal breath sounds.   Abdominal:      General: Abdomen is flat. Bowel sounds are normal. There is no distension.      Palpations: Abdomen is soft. There is no hepatomegaly, splenomegaly or mass.      Tenderness: There is no right CVA tenderness, left CVA tenderness or guarding. Negative signs include Nance's sign and McBurney's sign.      Comments: Perceived discomfort just above right pelvis laterally.   No rash    Musculoskeletal:      Cervical back: Neck supple. No rigidity.   Lymphadenopathy:      Cervical: No cervical adenopathy.   Skin:     General: Skin is warm and dry.      Findings: No rash.   Neurological:      General: No focal deficit present.      Mental Status: He is alert and oriented to person, place, and time.   Psychiatric:         Mood and Affect: Mood normal.         Behavior: Behavior normal.     UA: WNL     Diagnosis and associated orders:     1. Right lower quadrant abdominal pain    2. Right flank pain  - POCT Urinalysis       Comments/MDM:     This is a very pleasant 47-year-old male who presents to the urgent care with complaints of right flank pain onset for about 1 week.  The pain is very mild but he had some concern due to lingering discomfort. Pain reproduced with coughing and " sneezing. He is well-appearing in no acute distress.  Vital signs reassuring.  Examination is benign.  I was unable to elicit any abdominal tenderness on exam.  Tolerating p.o. intake well.  Good appetite.  UA normal. Discussed wide differential diagnosis. Possible strain. Recommend symptomatic and supportive care and close monitoring at this time.  Increase fluid intake, warm compresses or heat, Tylenol or ibuprofen, ROM exercises, massage.      I personally reviewed prior external notes and test results pertinent to today's visit. Red flags discussed and indications to present to the Emergency Department. Pathogenesis of diagnosis discussed including typical length and natural progression. Supportive care, natural history, differential diagnoses, and indications for immediate follow-up discussed. Patient expresses understanding and agrees to plan. Patient denies any other questions or concerns.     Follow-up with the primary care physician for recheck, reevaluation, and consideration of further management.    Please note that this dictation was created using voice recognition software. I have made a reasonable attempt to correct obvious errors, but I expect that there are errors of grammar and possibly content that I did not discover before finalizing the note.    This note was electronically signed by Merrill Cadet PA-C

## 2023-01-17 ENCOUNTER — OFFICE VISIT (OUTPATIENT)
Dept: MEDICAL GROUP | Facility: PHYSICIAN GROUP | Age: 48
End: 2023-01-17
Payer: COMMERCIAL

## 2023-01-17 ENCOUNTER — HOSPITAL ENCOUNTER (OUTPATIENT)
Dept: LAB | Facility: MEDICAL CENTER | Age: 48
End: 2023-01-17
Attending: NURSE PRACTITIONER
Payer: COMMERCIAL

## 2023-01-17 VITALS
DIASTOLIC BLOOD PRESSURE: 90 MMHG | TEMPERATURE: 97.9 F | BODY MASS INDEX: 28.17 KG/M2 | HEART RATE: 93 BPM | SYSTOLIC BLOOD PRESSURE: 130 MMHG | OXYGEN SATURATION: 100 % | WEIGHT: 190.2 LBS | RESPIRATION RATE: 20 BRPM | HEIGHT: 69 IN

## 2023-01-17 DIAGNOSIS — E13.9 DIABETES 1.5, MANAGED AS TYPE 2 (HCC): ICD-10-CM

## 2023-01-17 DIAGNOSIS — R10.12 LUQ PAIN: ICD-10-CM

## 2023-01-17 DIAGNOSIS — R10.13 EPIGASTRIC PAIN: ICD-10-CM

## 2023-01-17 LAB
ALBUMIN SERPL BCP-MCNC: 4.5 G/DL (ref 3.2–4.9)
ALBUMIN/GLOB SERPL: 1.5 G/DL
ALP SERPL-CCNC: 73 U/L (ref 30–99)
ALT SERPL-CCNC: 32 U/L (ref 2–50)
ANION GAP SERPL CALC-SCNC: 7 MMOL/L (ref 7–16)
AST SERPL-CCNC: 23 U/L (ref 12–45)
BASOPHILS # BLD AUTO: 1.1 % (ref 0–1.8)
BASOPHILS # BLD: 0.09 K/UL (ref 0–0.12)
BILIRUB SERPL-MCNC: 0.2 MG/DL (ref 0.1–1.5)
BUN SERPL-MCNC: 13 MG/DL (ref 8–22)
CALCIUM ALBUM COR SERPL-MCNC: 8.6 MG/DL (ref 8.5–10.5)
CALCIUM SERPL-MCNC: 9 MG/DL (ref 8.5–10.5)
CHLORIDE SERPL-SCNC: 102 MMOL/L (ref 96–112)
CHOLEST SERPL-MCNC: 232 MG/DL (ref 100–199)
CO2 SERPL-SCNC: 30 MMOL/L (ref 20–33)
CREAT SERPL-MCNC: 0.65 MG/DL (ref 0.5–1.4)
CREAT UR-MCNC: 223.91 MG/DL
EOSINOPHIL # BLD AUTO: 0.37 K/UL (ref 0–0.51)
EOSINOPHIL NFR BLD: 4.7 % (ref 0–6.9)
ERYTHROCYTE [DISTWIDTH] IN BLOOD BY AUTOMATED COUNT: 40.6 FL (ref 35.9–50)
EST. AVERAGE GLUCOSE BLD GHB EST-MCNC: 123 MG/DL
FASTING STATUS PATIENT QL REPORTED: NORMAL
GFR SERPLBLD CREATININE-BSD FMLA CKD-EPI: 118 ML/MIN/1.73 M 2
GLOBULIN SER CALC-MCNC: 3.1 G/DL (ref 1.9–3.5)
GLUCOSE SERPL-MCNC: 131 MG/DL (ref 65–99)
HBA1C MFR BLD: 5.9 % (ref 4–5.6)
HCT VFR BLD AUTO: 39.5 % (ref 42–52)
HDLC SERPL-MCNC: 50 MG/DL
HGB BLD-MCNC: 13.2 G/DL (ref 14–18)
IMM GRANULOCYTES # BLD AUTO: 0.02 K/UL (ref 0–0.11)
IMM GRANULOCYTES NFR BLD AUTO: 0.3 % (ref 0–0.9)
LDLC SERPL CALC-MCNC: 146 MG/DL
LIPASE SERPL-CCNC: 27 U/L (ref 11–82)
LYMPHOCYTES # BLD AUTO: 2.52 K/UL (ref 1–4.8)
LYMPHOCYTES NFR BLD: 32 % (ref 22–41)
MCH RBC QN AUTO: 28.8 PG (ref 27–33)
MCHC RBC AUTO-ENTMCNC: 33.4 G/DL (ref 33.7–35.3)
MCV RBC AUTO: 86.1 FL (ref 81.4–97.8)
MICROALBUMIN UR-MCNC: 1.5 MG/DL
MICROALBUMIN/CREAT UR: 7 MG/G (ref 0–30)
MONOCYTES # BLD AUTO: 0.62 K/UL (ref 0–0.85)
MONOCYTES NFR BLD AUTO: 7.9 % (ref 0–13.4)
NEUTROPHILS # BLD AUTO: 4.25 K/UL (ref 1.82–7.42)
NEUTROPHILS NFR BLD: 54 % (ref 44–72)
NRBC # BLD AUTO: 0 K/UL
NRBC BLD-RTO: 0 /100 WBC
PLATELET # BLD AUTO: 347 K/UL (ref 164–446)
PMV BLD AUTO: 9.8 FL (ref 9–12.9)
POTASSIUM SERPL-SCNC: 4 MMOL/L (ref 3.6–5.5)
PROT SERPL-MCNC: 7.6 G/DL (ref 6–8.2)
RBC # BLD AUTO: 4.59 M/UL (ref 4.7–6.1)
SODIUM SERPL-SCNC: 139 MMOL/L (ref 135–145)
TRIGL SERPL-MCNC: 182 MG/DL (ref 0–149)
TSH SERPL DL<=0.005 MIU/L-ACNC: 0.88 UIU/ML (ref 0.38–5.33)
VIT B12 SERPL-MCNC: 575 PG/ML (ref 211–911)
WBC # BLD AUTO: 7.9 K/UL (ref 4.8–10.8)

## 2023-01-17 PROCEDURE — 36415 COLL VENOUS BLD VENIPUNCTURE: CPT

## 2023-01-17 PROCEDURE — 84443 ASSAY THYROID STIM HORMONE: CPT

## 2023-01-17 PROCEDURE — 80061 LIPID PANEL: CPT

## 2023-01-17 PROCEDURE — 80053 COMPREHEN METABOLIC PANEL: CPT

## 2023-01-17 PROCEDURE — 99214 OFFICE O/P EST MOD 30 MIN: CPT | Performed by: NURSE PRACTITIONER

## 2023-01-17 PROCEDURE — 83690 ASSAY OF LIPASE: CPT

## 2023-01-17 PROCEDURE — 82043 UR ALBUMIN QUANTITATIVE: CPT

## 2023-01-17 PROCEDURE — 85025 COMPLETE CBC W/AUTO DIFF WBC: CPT

## 2023-01-17 PROCEDURE — 82607 VITAMIN B-12: CPT

## 2023-01-17 PROCEDURE — 83036 HEMOGLOBIN GLYCOSYLATED A1C: CPT

## 2023-01-17 PROCEDURE — 82570 ASSAY OF URINE CREATININE: CPT

## 2023-01-17 RX ORDER — SUCRALFATE 1 G/1
1 TABLET ORAL
Qty: 120 TABLET | Refills: 0 | Status: SHIPPED | OUTPATIENT
Start: 2023-01-17 | End: 2023-02-14

## 2023-01-17 RX ORDER — OMEPRAZOLE 20 MG/1
20 CAPSULE, DELAYED RELEASE ORAL DAILY
Qty: 30 CAPSULE | Refills: 0 | Status: SHIPPED | OUTPATIENT
Start: 2023-01-17 | End: 2023-02-14

## 2023-01-17 ASSESSMENT — FIBROSIS 4 INDEX: FIB4 SCORE: 0.53

## 2023-01-17 ASSESSMENT — PAIN SCALES - GENERAL: PAINLEVEL: 8=MODERATE-SEVERE PAIN

## 2023-01-17 NOTE — PATIENT INSTRUCTIONS
Start omeprazole once a day.  Discontinue Pepcid.    Start Carafate 4 times a day.  Take for 4 weeks.  This will help with gastritis.    Get CT scan done.    Referred back to Dr. Chavez.

## 2023-01-17 NOTE — ASSESSMENT & PLAN NOTE
"Patient reports 1 month onset of abdominal pain radiating to back, \"goes around my beltline.\"  Symptoms are in epigastrium and left upper quadrant.  Sometimes it feels like it starts in the back and radiates to the front.  Waxes and wanes.  Has been pretty constant for the last couple days.  Missing work the last 2 days.  Sometimes aggravated by laying on his back.  Needs to prop himself up with a pillow.   Associative symptoms are nausea.  Denies fever, hematuria, dysuria, diarrhea, constipation.  Has history of chronic low back pain, used to take opioids for management.  Has history of kidney stones and pancreatitis.  Reports pain does not feel like kidney stones have in the past.  Symptoms mildly similar to pancreatitis in the past.  "

## 2023-01-17 NOTE — PROGRESS NOTES
"CC: Radiating abdominal pain    HISTORY OF THE PRESENT ILLNESS: Patient is a 47 y.o. male. This pleasant patient is here today for evaluation and management of the following health problems.    Health Maintenance: Patient declined flu vaccine, though we reviewed benefits of flu vaccine.  Encouraged him to wash his hands frequently and stay out of environments with people who are ill.        Epigastric pain  Patient reports 1 month onset of abdominal pain radiating to back, \"goes around my beltline.\"  Symptoms are in epigastrium and left upper quadrant.  Sometimes it feels like it starts in the back and radiates to the front.  Waxes and wanes.  Has been pretty constant for the last couple days.  Missing work the last 2 days.  Sometimes aggravated by laying on his back.  Needs to prop himself up with a pillow.   Associative symptoms are nausea.  Denies fever, hematuria, dysuria, diarrhea, constipation.  Has history of chronic low back pain, used to take opioids for management.  Has history of kidney stones and pancreatitis.  Reports pain does not feel like kidney stones have in the past.  Symptoms mildly similar to pancreatitis in the past.    Diabetes 1.5, managed as type 2 (HCC)  Chronic problem.  Taking metformin every other day.  Will often feel very lethargic after taking metformin.  Overdue for follow-up with endocrinology.  Blood sugars averaging around 200.      Allergies: Bee venom    Current Outpatient Medications Ordered in Epic   Medication Sig Dispense Refill    sucralfate (CARAFATE) 1 GM Tab Take 1 Tablet by mouth 4 Times a Day,Before Meals and at Bedtime. 120 Tablet 0    omeprazole (PRILOSEC) 20 MG delayed-release capsule Take 1 Capsule by mouth every day. 30 Capsule 0    metFORMIN ER (GLUCOPHAGE XR) 500 MG TABLET SR 24 HR TAKE 1 TABLET BY MOUTH  DAILY (Patient taking differently: Takes one ever other day) 90 Tablet 2    albuterol 108 (90 Base) MCG/ACT Aero Soln inhalation aerosol Inhale 2 Puffs every " "four hours as needed. With spacer device 18.2 g 0    Lancets (ONETOUCH DELICA PLUS [id number]) Misc USE ONE LANCET TO TEST BLOOD SUGAR ONCE DAILY 100 Each 3    ONETOUCH ULTRA strip USE TO TEST BLOOD SUGAR ONCE DAILY      Blood Glucose Monitoring Suppl (ONE TOUCH ULTRA 2) w/Device Kit TEST BLOOD SUGAR AS RECOMMENDED BY PROVIDER      Blood Glucose Test Strips Use one  strip to test blood sugar once daily . 100 Strip 0     No current Epic-ordered facility-administered medications on file.       Past Medical History:   Diagnosis Date    Anxiety 2016    Controlled substance agreement broken 2016    Hangnail 2016    Lumbar back pain 6/10/2015       No past surgical history on file.    Social History     Tobacco Use    Smoking status: Former     Packs/day: 0.25     Years: 15.00     Pack years: 3.75     Types: Cigars, Cigarettes     Quit date: 2013     Years since quittin.8    Smokeless tobacco: Never   Vaping Use    Vaping Use: Never used   Substance Use Topics    Alcohol use: Yes     Alcohol/week: 1.2 oz     Types: 2 Standard drinks or equivalent per week    Drug use: No       Family History   Adopted: Yes   Family history unknown: Yes       ROS: As in HPI, otherwise negative for chest pain, dyspnea,  dysuria, blood in stool, fever             Exam: BP (!) 130/90   Pulse 93   Temp 36.6 °C (97.9 °F) (Temporal)   Resp 20   Ht 1.753 m (5' 9\")   Wt 86.3 kg (190 lb 3.2 oz)   SpO2 100%  Body mass index is 28.09 kg/m².    General: Alert, pleasant, well nourished, well developed male in NAD  HEENT: Normocephalic. Eyes conjunctiva clear lids without ptosis  Neck: Supple without bruit. Thyroid is not enlarged.  Pulmonary: Clear to ausculation.  Normal effort. No rales, ronchi, or wheezing.  Cardiovascular: Normal rate and rhythm without murmur.   Abdomen: Soft, tenderness to epigastrium and left lower quadrant, nondistended. Normal bowel sounds. Liver and spleen are not palpable.  No rebound " tenderness or guarding.  Neurologic: Grossly nonfocal  Lymph: No cervical or supraclavicular lymph nodes are palpable  Skin: Warm and dry.   Musculoskeletal: Normal gait.   Psych: Normal mood and affect. Alert and oriented. Judgment and insight is normal.    Please note that this dictation was created using voice recognition software. I have made every reasonable attempt to correct obvious errors, but I expect that there are errors of grammar and possibly content that I did not discover before finalizing the note.      Assessment/Plan  1. Diabetes 1.5, managed as type 2 (HCC)    - Comp Metabolic Panel; Future  - ESTIMATED GFR; Future  - HEMOGLOBIN A1C; Future  - MICROALBUMIN CREAT RATIO URINE; Future  - Lipid Profile; Future  - VITAMIN B12; Future  - TSH WITH REFLEX TO FT4; Future  - CBC WITH DIFFERENTIAL; Future    2. LUQ pain    - LIPASE; Future  - Referral to Gastroenterology  - CT-ABDOMEN W/O; Future    3. Epigastric pain    - sucralfate (CARAFATE) 1 GM Tab; Take 1 Tablet by mouth 4 Times a Day,Before Meals and at Bedtime.  Dispense: 120 Tablet; Refill: 0  - omeprazole (PRILOSEC) 20 MG delayed-release capsule; Take 1 Capsule by mouth every day.  Dispense: 30 Capsule; Refill: 0  - Referral to Gastroenterology  - CT-ABDOMEN W/O; Future      Shared clinical decision making: Reviewed differentials with patient including, but not limited to, gastritis, pancreatitis diverticulitis, GERD.  We will get lab work done today.  CT scan of abdomen ordered.  Start Carafate and omeprazole.  Strict ER precautions reviewed with patient.

## 2023-01-18 NOTE — ASSESSMENT & PLAN NOTE
Chronic problem.  Taking metformin every other day.  Will often feel very lethargic after taking metformin.  Overdue for follow-up with endocrinology.  Blood sugars averaging around 200.

## 2023-01-23 ENCOUNTER — OFFICE VISIT (OUTPATIENT)
Dept: URGENT CARE | Facility: PHYSICIAN GROUP | Age: 48
End: 2023-01-23
Payer: COMMERCIAL

## 2023-01-23 ENCOUNTER — TELEPHONE (OUTPATIENT)
Dept: MEDICAL GROUP | Facility: PHYSICIAN GROUP | Age: 48
End: 2023-01-23
Payer: COMMERCIAL

## 2023-01-23 VITALS
HEART RATE: 89 BPM | TEMPERATURE: 99 F | RESPIRATION RATE: 16 BRPM | SYSTOLIC BLOOD PRESSURE: 110 MMHG | HEIGHT: 69 IN | DIASTOLIC BLOOD PRESSURE: 80 MMHG | BODY MASS INDEX: 28.58 KG/M2 | WEIGHT: 193 LBS | OXYGEN SATURATION: 92 %

## 2023-01-23 DIAGNOSIS — J20.8 VIRAL BRONCHITIS: ICD-10-CM

## 2023-01-23 PROCEDURE — 99213 OFFICE O/P EST LOW 20 MIN: CPT | Mod: 25 | Performed by: PHYSICIAN ASSISTANT

## 2023-01-23 PROCEDURE — 94640 AIRWAY INHALATION TREATMENT: CPT | Performed by: PHYSICIAN ASSISTANT

## 2023-01-23 RX ORDER — BENZONATATE 200 MG/1
200 CAPSULE ORAL 3 TIMES DAILY PRN
Qty: 30 CAPSULE | Refills: 0 | Status: SHIPPED | OUTPATIENT
Start: 2023-01-23 | End: 2023-02-08

## 2023-01-23 RX ORDER — IPRATROPIUM BROMIDE AND ALBUTEROL SULFATE 2.5; .5 MG/3ML; MG/3ML
3 SOLUTION RESPIRATORY (INHALATION) ONCE
Status: COMPLETED | OUTPATIENT
Start: 2023-01-23 | End: 2023-01-23

## 2023-01-23 RX ORDER — DEXTROMETHORPHAN HYDROBROMIDE AND PROMETHAZINE HYDROCHLORIDE 15; 6.25 MG/5ML; MG/5ML
5 SYRUP ORAL EVERY 4 HOURS PRN
Qty: 120 ML | Refills: 0 | Status: SHIPPED | OUTPATIENT
Start: 2023-01-23 | End: 2023-02-08

## 2023-01-23 RX ORDER — METHYLPREDNISOLONE 4 MG/1
TABLET ORAL
Qty: 21 TABLET | Refills: 0 | Status: SHIPPED | OUTPATIENT
Start: 2023-01-23 | End: 2023-02-08

## 2023-01-23 RX ORDER — ALBUTEROL SULFATE 90 UG/1
2 AEROSOL, METERED RESPIRATORY (INHALATION) EVERY 6 HOURS PRN
Qty: 8.5 G | Refills: 0 | Status: SHIPPED | OUTPATIENT
Start: 2023-01-23 | End: 2023-05-17 | Stop reason: SDUPTHER

## 2023-01-23 RX ADMIN — IPRATROPIUM BROMIDE AND ALBUTEROL SULFATE 3 ML: 2.5; .5 SOLUTION RESPIRATORY (INHALATION) at 18:05

## 2023-01-23 ASSESSMENT — ENCOUNTER SYMPTOMS
FEVER: 0
SINUS PAIN: 1
ABDOMINAL PAIN: 0
DIARRHEA: 0
CHILLS: 0
EYE PAIN: 0
HEADACHES: 1
MYALGIAS: 0
BLURRED VISION: 0
PALPITATIONS: 0
SPUTUM PRODUCTION: 1
DIZZINESS: 0
NAUSEA: 0
VOMITING: 0
SORE THROAT: 0
COUGH: 1
WHEEZING: 1
SHORTNESS OF BREATH: 1

## 2023-01-23 ASSESSMENT — FIBROSIS 4 INDEX: FIB4 SCORE: 0.53

## 2023-01-24 NOTE — PROGRESS NOTES
"Subjective     Skip Bernard is a 47 y.o. male who presents with Cough (X 2 days chest is on fire), Headache (X 2 days), and Nasal Congestion    HPI:  Skip Bernard is a 47 y.o. male who presents today for evaluation of cough.  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  Says he did not get his low side and did not inhale any of the water but had to then crawl through 60 feet of snow notes that shortly after the cough got worse.  He has had some chest tightness, shortness of breath, wheezing.  He says that when he coughs he feels that his chest is \"on fire\".  He has not had any fever but says that he feels \"hot\".  He notes that he is also had some nasal congestion and headaches.  Says he has a history of bronchitis but denies any history of asthma or COPD.      Review of Systems   Constitutional:  Positive for malaise/fatigue. Negative for chills and fever.   HENT:  Positive for congestion and sinus pain. Negative for ear pain and sore throat.    Eyes:  Negative for blurred vision and pain.   Respiratory:  Positive for cough, sputum production, shortness of breath and wheezing.    Cardiovascular:  Negative for chest pain and palpitations.   Gastrointestinal:  Negative for abdominal pain, diarrhea, nausea and vomiting.   Musculoskeletal:  Negative for myalgias.   Skin:  Negative for rash.   Neurological:  Positive for headaches. Negative for dizziness.         PMH:  has a past medical history of Anxiety (11/23/2016), Controlled substance agreement broken (12/20/2016), Hangnail (12/20/2016), and Lumbar back pain (6/10/2015).    He has no past medical history of Asthma.  MEDS:   Current Outpatient Medications:     methylPREDNISolone (MEDROL DOSEPAK) 4 MG Tablet Therapy Pack, Follow schedule on package instructions., Disp: 21 Tablet, Rfl: 0    benzonatate (TESSALON) 200 MG capsule, Take 1 Capsule by mouth 3 times a day as needed for Cough., Disp: 30 Capsule, Rfl: 0    " "promethazine-dextromethorphan (PROMETHAZINE-DM) 6.25-15 MG/5ML syrup, Take 5 mL by mouth every four hours as needed for Cough., Disp: 120 mL, Rfl: 0    albuterol 108 (90 Base) MCG/ACT Aero Soln inhalation aerosol, Inhale 2 Puffs every 6 hours as needed for Shortness of Breath., Disp: 8.5 g, Rfl: 0    sucralfate (CARAFATE) 1 GM Tab, Take 1 Tablet by mouth 4 Times a Day,Before Meals and at Bedtime., Disp: 120 Tablet, Rfl: 0    omeprazole (PRILOSEC) 20 MG delayed-release capsule, Take 1 Capsule by mouth every day., Disp: 30 Capsule, Rfl: 0    metFORMIN ER (GLUCOPHAGE XR) 500 MG TABLET SR 24 HR, TAKE 1 TABLET BY MOUTH  DAILY (Patient taking differently: Takes one ever other day), Disp: 90 Tablet, Rfl: 2    Lancets (ONETOUCH DELICA PLUS [id number]) Misc, USE ONE LANCET TO TEST BLOOD SUGAR ONCE DAILY, Disp: 100 Each, Rfl: 3    Blood Glucose Monitoring Suppl (ONE TOUCH ULTRA 2) w/Device Kit, TEST BLOOD SUGAR AS RECOMMENDED BY PROVIDER, Disp: , Rfl:     Blood Glucose Test Strips, Use one  strip to test blood sugar once daily ., Disp: 100 Strip, Rfl: 0    ONETOUCH ULTRA strip, USE TO TEST BLOOD SUGAR ONCE DAILY (Patient not taking: Reported on 1/23/2023), Disp: , Rfl:     Current Facility-Administered Medications:     ipratropium-albuterol (DUONEB) nebulizer solution, 3 mL, Nebulization, Once, Ann Marie Villalba P.A.-C.  ALLERGIES:   Allergies   Allergen Reactions    Bee Venom      SURGHX: No past surgical history on file.  SOCHX:  reports that he quit smoking about 9 years ago. His smoking use included cigars. He has a 3.75 pack-year smoking history. He has never used smokeless tobacco. He reports current alcohol use of about 1.2 oz per week. He reports that he does not use drugs.  FH: Family history was reviewed, no pertinent findings to report      Objective     /80   Pulse 89   Temp 37.2 °C (99 °F) (Temporal)   Resp 16   Ht 1.753 m (5' 9\")   Wt 87.5 kg (193 lb)   SpO2 92%   BMI 28.50 kg/m²      Physical " Exam  Constitutional:       Appearance: He is well-developed.   HENT:      Head: Normocephalic and atraumatic.      Right Ear: Tympanic membrane, ear canal and external ear normal.      Left Ear: Tympanic membrane, ear canal and external ear normal.      Nose: Mucosal edema, congestion and rhinorrhea present. Rhinorrhea is clear.      Mouth/Throat:      Lips: Pink.      Mouth: Mucous membranes are moist.      Pharynx: Oropharynx is clear.   Eyes:      Conjunctiva/sclera: Conjunctivae normal.      Pupils: Pupils are equal, round, and reactive to light.   Cardiovascular:      Rate and Rhythm: Normal rate and regular rhythm.      Heart sounds: Normal heart sounds. No murmur heard.  Pulmonary:      Effort: Pulmonary effort is normal.      Breath sounds: Wheezing present. No decreased breath sounds, rhonchi or rales.      Comments: Diffuse inspiratory and expiratory wheezes auscultated  Musculoskeletal:      Cervical back: Normal range of motion.   Lymphadenopathy:      Cervical: No cervical adenopathy.   Skin:     General: Skin is warm and dry.      Capillary Refill: Capillary refill takes less than 2 seconds.   Neurological:      Mental Status: He is alert and oriented to person, place, and time.   Psychiatric:         Behavior: Behavior normal.         Judgment: Judgment normal.            Status post nebulizer treatment wheezing was 90% improved and patient reported feeling improvement in his shortness of breath.      Assessment & Plan        1. Viral bronchitis  - ipratropium-albuterol (DUONEB) nebulizer solution  - methylPREDNISolone (MEDROL DOSEPAK) 4 MG Tablet Therapy Pack; Follow schedule on package instructions.  Dispense: 21 Tablet; Refill: 0  - benzonatate (TESSALON) 200 MG capsule; Take 1 Capsule by mouth 3 times a day as needed for Cough.  Dispense: 30 Capsule; Refill: 0  - promethazine-dextromethorphan (PROMETHAZINE-DM) 6.25-15 MG/5ML syrup; Take 5 mL by mouth every four hours as needed for Cough.   Dispense: 120 mL; Refill: 0  - albuterol 108 (90 Base) MCG/ACT Aero Soln inhalation aerosol; Inhale 2 Puffs every 6 hours as needed for Shortness of Breath.  Dispense: 8.5 g; Refill: 0  Symptoms and exam findings most consistent with viral bronchitis.  We will treat with Medrol Dosepak, benzonatate tablets, Promethazine DM cough syrup, and an albuterol inhaler.  Supportive care also discussed include the use of saline nasal rinses, steam inhalation, and the use of a cool-mist humidifier in the bedroom at night.  - PO fluids  - Rest  If no significant improvement in symptoms after 3 to 4 days I would recommend that he return to the urgent care for reevaluation.            Differential Diagnosis, natural history, and supportive care discussed. Return to the Urgent Care or follow up with your PCP if symptoms fail to resolve, or for any new or worsening symptoms. Emergency room precautions discussed. Patient and/or family appears understanding of information.

## 2023-02-08 ENCOUNTER — OFFICE VISIT (OUTPATIENT)
Dept: MEDICAL GROUP | Facility: PHYSICIAN GROUP | Age: 48
End: 2023-02-08
Payer: COMMERCIAL

## 2023-02-08 VITALS
TEMPERATURE: 98.5 F | HEIGHT: 69 IN | BODY MASS INDEX: 28.24 KG/M2 | HEART RATE: 87 BPM | SYSTOLIC BLOOD PRESSURE: 120 MMHG | OXYGEN SATURATION: 94 % | RESPIRATION RATE: 16 BRPM | DIASTOLIC BLOOD PRESSURE: 80 MMHG | WEIGHT: 190.7 LBS

## 2023-02-08 DIAGNOSIS — E13.9 DIABETES 1.5, MANAGED AS TYPE 2 (HCC): ICD-10-CM

## 2023-02-08 DIAGNOSIS — J45.40 MODERATE PERSISTENT REACTIVE AIRWAY DISEASE WITHOUT COMPLICATION: ICD-10-CM

## 2023-02-08 DIAGNOSIS — R10.13 EPIGASTRIC PAIN: ICD-10-CM

## 2023-02-08 PROBLEM — J45.909 REACTIVE AIRWAY DISEASE: Status: ACTIVE | Noted: 2023-02-08

## 2023-02-08 PROCEDURE — 99214 OFFICE O/P EST MOD 30 MIN: CPT | Performed by: NURSE PRACTITIONER

## 2023-02-08 ASSESSMENT — FIBROSIS 4 INDEX: FIB4 SCORE: 0.53

## 2023-02-08 ASSESSMENT — PATIENT HEALTH QUESTIONNAIRE - PHQ9: CLINICAL INTERPRETATION OF PHQ2 SCORE: 0

## 2023-02-09 NOTE — PATIENT INSTRUCTIONS
Call for appointment:    Dr. Khanh Chavez  [facility]  [address], [address]  [place] [address] [phone]

## 2023-02-09 NOTE — ASSESSMENT & PLAN NOTE
"Chronic problem. Taking 500 mg metformin every other day. Reports feeling tired \"bottomed out\" after taking metformin. Am blood sugars averaging around 80-90, denies episodes of hypoglycemia. Pt wants to try and stop metformin with close follow up, not interested in folowing up with endocrinology. Pt walks/runs on weekends, pt wanting to manage with lifestyle. The 10-year ASCVD risk score (Yanira ALMEIDA, et al., 2019) is: 4.5%  "

## 2023-02-09 NOTE — ASSESSMENT & PLAN NOTE
Pt reports frequent use of albuterol inhaler, notices difference in breathing improvement when using twice a day. Pt reports feeling SOB and feeling wheezy while working as  or with activity. Pt reports am cough every morning for about 10-15 min. Pt is past smoker and recently finished steroid dose pack for viral bronchitis with RX for albuterol inhaler. Discussed adding maintenance ICS inhaler, educated on rinsing mouth and appropriate use of albuterol for rescue inhaler or 30 min prior to activity. Discussed PFT's test for future evaluation and treatment.

## 2023-02-09 NOTE — ASSESSMENT & PLAN NOTE
Pt reports improvement to left epigastric pain and heart burn with Carafate and omeprazole. Still reports some right sided pain, pt still plans to follow up with Dr. Chavez for GI consult and possible endoscopy and colonoscopy.   CT scan, scanned in chart, shows no abnormalities except for possible fatty liver.

## 2023-02-09 NOTE — NON-PROVIDER
"CC:  follow up diabetes    HISTORY OF THE PRESENT ILLNESS: Patient is a 47 y.o. male. This pleasant patient is here today for evaluation and management of the following health problems.    Diabetes 1.5, manages as type 2  Chronic problem. Taking 500 mg metformin every other day. Reports feeling tired \"bottomed out\" after taking metformin. Am blood sugars averaging around 80-90, denies episodes of hypoglycemia. Pt wants to try and stop metformin with close follow up, not interested in folowing up with endocrinology. Pt walks/runs on weekends, pt wanting to manage with lifestyle. The 10-year ASCVD risk score (Yanira ALMEIDA, et al., 2019) is: 4.5%    Moderate persistent reactive airway disease  Pt reports frequent use of albuterol inhaler, notices difference in breathing improvement when using twice a day. Pt reports feeling SOB and feeling wheezy while working as  or with activity. Pt reports am cough every morning for about 10-15 min. Pt is past smoker and recently finished steroid dose pack for viral bronchitis with RX for albuterol inhaler. Discussed adding maintenance ICS inhaler, educated on rinsing mouth and appropriate use of albuterol for rescue inhaler or 30 min prior to activity. Discussed PFT's test for future evaluation and treatment.     Epigastric pain  Pt reports improvement to left epigastric pain and heart burn with Carafate and omeprazole. Still reports some right sided pain, pt still plans to follow up with Dr. Chavez for GI consult and possible endoscopy and colonoscopy.     No problem-specific Assessment & Plan notes found for this encounter.      Allergies: Bee venom    Current Outpatient Medications Ordered in Epic   Medication Sig Dispense Refill    albuterol 108 (90 Base) MCG/ACT Aero Soln inhalation aerosol Inhale 2 Puffs every 6 hours as needed for Shortness of Breath. 8.5 g 0    sucralfate (CARAFATE) 1 GM Tab Take 1 Tablet by mouth 4 Times a Day,Before Meals and at Bedtime. 120 Tablet 0 "    omeprazole (PRILOSEC) 20 MG delayed-release capsule Take 1 Capsule by mouth every day. 30 Capsule 0    metFORMIN ER (GLUCOPHAGE XR) 500 MG TABLET SR 24 HR TAKE 1 TABLET BY MOUTH  DAILY (Patient taking differently: Takes one ever other day) 90 Tablet 2    Lancets (ONETOUCH DELICA PLUS [id number]) Misc USE ONE LANCET TO TEST BLOOD SUGAR ONCE DAILY 100 Each 3    Blood Glucose Monitoring Suppl (ONE TOUCH ULTRA 2) w/Device Kit TEST BLOOD SUGAR AS RECOMMENDED BY PROVIDER      Blood Glucose Test Strips Use one  strip to test blood sugar once daily . 100 Strip 0    methylPREDNISolone (MEDROL DOSEPAK) 4 MG Tablet Therapy Pack Follow schedule on package instructions. (Patient not taking: Reported on 2023) 21 Tablet 0    benzonatate (TESSALON) 200 MG capsule Take 1 Capsule by mouth 3 times a day as needed for Cough. (Patient not taking: Reported on 2023) 30 Capsule 0    promethazine-dextromethorphan (PROMETHAZINE-DM) 6.25-15 MG/5ML syrup Take 5 mL by mouth every four hours as needed for Cough. (Patient not taking: Reported on 2023) 120 mL 0    ONETOUCH ULTRA strip USE TO TEST BLOOD SUGAR ONCE DAILY (Patient not taking: Reported on 2023)       No current [facility]-ordered facility-administered medications on file.       Past Medical History:   Diagnosis Date    Anxiety 2016    Controlled substance agreement broken 2016    Hangnail 2016    Lumbar back pain 6/10/2015       History reviewed. No pertinent surgical history.    Social History     Tobacco Use    Smoking status: Former     Packs/day: 0.25     Years: 15.00     Pack years: 3.75     Types: Cigars, Cigarettes     Quit date: 2013     Years since quittin.9    Smokeless tobacco: Never   Vaping Use    Vaping Use: Never used   Substance Use Topics    Alcohol use: Yes     Alcohol/week: 1.2 oz     Types: 2 Standard drinks or equivalent per week    Drug use: No       Family History   Adopted: Yes   Family history unknown: Yes       ROS: see  "HPI    Exam: /80   Pulse 87   Temp 36.9 °C (98.5 °F) (Temporal)   Resp 16   Ht 1.753 m (5' 9\")   Wt 86.5 kg (190 lb 11.2 oz)   SpO2 94%  Body mass index is 28.16 kg/m².    General: Well nourished, well developed male in NAD  HEENT: Normocephalic. Eyes conjunctiva clear lids without ptosis  Neck: Supple without bruit. Thyroid is not enlarged.  Pulmonary: Clear to ausculation.  Normal effort. No rales, ronchi, or wheezing.  Cardiovascular: Normal rate and rhythm without murmur. No lower extremity edema.  Neurologic: Grossly nonfocal  Lymph: No cervical or supraclavicular lymph nodes are palpable  Skin: Warm and dry.  No obvious lesions.  Musculoskeletal: Normal gait.   Psych: Normal mood and affect. Alert and oriented. Judgment and insight is normal.    Monofilament testing with a 10 gram force: sensation intact: intact bilaterally  Visual Inspection: Feet without maceration, ulcers, fissures.  Pedal pulses: intact bilaterally     "

## 2023-02-09 NOTE — PROGRESS NOTES
"CC: Lab review, follow-up on diabetes and abdominal pain    HISTORY OF THE PRESENT ILLNESS: Patient is a 47 y.o. male. This pleasant patient is here today for evaluation and management of the following health problems.        Diabetes 1.5, managed as type 2 (HCC)  Chronic problem. Taking 500 mg metformin every other day. Reports feeling tired \"bottomed out\" after taking metformin. Am blood sugars averaging around 80-90, denies episodes of hypoglycemia. Pt wants to try and stop metformin with close follow up, not interested in folowing up with endocrinology. Pt walks/runs on weekends, pt wanting to manage with lifestyle. The 10-year ASCVD risk score (Yanira DK, et al., 2019) is: 4.5%    Reactive airway disease  Pt reports frequent use of albuterol inhaler, notices difference in breathing improvement when using twice a day. Pt reports feeling SOB and feeling wheezy while working as  or with activity. Pt reports am cough every morning for about 10-15 min. Pt is past smoker and recently finished steroid dose pack for viral bronchitis with RX for albuterol inhaler. Discussed adding maintenance ICS inhaler, educated on rinsing mouth and appropriate use of albuterol for rescue inhaler or 30 min prior to activity. Discussed PFT's test for future evaluation and treatment.    Epigastric pain  Pt reports improvement to left epigastric pain and heart burn with Carafate and omeprazole. Still reports some right sided pain, pt still plans to follow up with Dr. Chavez for GI consult and possible endoscopy and colonoscopy.   CT scan, scanned in chart, shows no abnormalities except for possible fatty liver.    Allergies: Bee venom    Current Outpatient Medications Ordered in Epic   Medication Sig Dispense Refill    budesonide (PULMICORT) 90 MCG/ACT AEROSOL POWDER, BREATH ACTIVATED Inhale 2 Puffs 2 times a day. 3 Each 1    albuterol 108 (90 Base) MCG/ACT Aero Soln inhalation aerosol Inhale 2 Puffs every 6 hours as needed for " "Shortness of Breath. 8.5 g 0    sucralfate (CARAFATE) 1 GM Tab Take 1 Tablet by mouth 4 Times a Day,Before Meals and at Bedtime. 120 Tablet 0    omeprazole (PRILOSEC) 20 MG delayed-release capsule Take 1 Capsule by mouth every day. 30 Capsule 0    Lancets (ONETOUCH DELICA PLUS [id number]) Misc USE ONE LANCET TO TEST BLOOD SUGAR ONCE DAILY 100 Each 3    Blood Glucose Monitoring Suppl (ONE TOUCH ULTRA 2) w/Device Kit TEST BLOOD SUGAR AS RECOMMENDED BY PROVIDER      Blood Glucose Test Strips Use one  strip to test blood sugar once daily . 100 Strip 0    ONETOUCH ULTRA strip USE TO TEST BLOOD SUGAR ONCE DAILY (Patient not taking: Reported on 2023)       No current [facility]-ordered facility-administered medications on file.       Past Medical History:   Diagnosis Date    Anxiety 2016    Controlled substance agreement broken 2016    Hangnail 2016    Lumbar back pain 6/10/2015    Reactive airway disease 2023       History reviewed. No pertinent surgical history.    Social History     Tobacco Use    Smoking status: Former     Packs/day: 0.25     Years: 15.00     Pack years: 3.75     Types: Cigars, Cigarettes     Quit date: 2013     Years since quittin.9    Smokeless tobacco: Never   Vaping Use    Vaping Use: Never used   Substance Use Topics    Alcohol use: Yes     Alcohol/week: 1.2 oz     Types: 2 Standard drinks or equivalent per week    Drug use: No       Family History   Adopted: Yes   Family history unknown: Yes       ROS: As in HPI, otherwise negative for chest pain, dyspnea, dysuria, blood in stool, fever    Exam: /80   Pulse 87   Temp 36.9 °C (98.5 °F) (Temporal)   Resp 16   Ht 1.753 m (5' 9\")   Wt 86.5 kg (190 lb 11.2 oz)   SpO2 94%  Body mass index is 28.16 kg/m².    General: Alert, pleasant, well nourished, well developed male in NAD  Neck: Supple   Pulmonary: Clear to ausculation.  Normal effort. No rales, ronchi, or wheezing.  Cardiovascular: Normal rate and rhythm " without murmur.  Abdomen: Soft, nontender, nondistended.   Neurologic: Grossly nonfocal  kin: Warm and dry.    Musculoskeletal: Normal gait.   Psych: Normal mood and affect. Alert and oriented. Judgment and insight is normal.    Diabetic Foot Exam: No ulcers or skin lesions present, patient tested with a 10 g force and is sensitive bilaterally throughout the ball of the foot, great toe and heel.  Dorsalis pedis and posterior tibial pulses are present and intact bilaterally      Please note that this dictation was created using voice recognition software. I have made every reasonable attempt to correct obvious errors, but I expect that there are errors of grammar and possibly content that I did not discover before finalizing the note.      Assessment/Plan  1. Moderate persistent reactive airway disease without complication   Discussed adding maintenance ICS inhaler, educated on rinsing mouth and appropriate use of albuterol for rescue inhaler or 30 min prior to activity. Discussed PFT's test for future evaluation and treatment.  - budesonide (PULMICORT) 90 MCG/ACT AEROSOL POWDER, BREATH ACTIVATED; Inhale 2 Puffs 2 times a day.  Dispense: 3 Each; Refill: 1  - PULMONARY FUNCTION TESTS -Test requested: Complete Pulmonary Function Test; Future  - ESTIMATED GFR; Future    2. Diabetes 1.5, managed as type 2 (HCC)  We will do trial of discontinuing metformin.  We will keep close eye on blood sugars and restart metformin if needed.  Again advised on reestablishing with endocrinology.  Patient declines.  He would like to wait and see what happens with his blood sugars while not taking metformin.  No abnormalities on foot exam today.  - Diabetic Monofilament Lower Extremity Exam  - HEMOGLOBIN A1C; Future  - Basic Metabolic Panel; Future    3. Epigastric pain  We will complete Carafate this week.  Continue with omeprazole.  Contact information for Dr. Chavez's office given to patient today.    Patient seen and evaluated with  FNP student, concur with documentation and findings, assessment and plan.    Please see additional documentation/note.

## 2023-02-13 DIAGNOSIS — R10.13 EPIGASTRIC PAIN: ICD-10-CM

## 2023-02-14 RX ORDER — SUCRALFATE 1 G/1
TABLET ORAL
Qty: 360 TABLET | Refills: 3 | Status: SHIPPED | OUTPATIENT
Start: 2023-02-14 | End: 2023-05-17

## 2023-02-14 RX ORDER — OMEPRAZOLE 20 MG/1
20 CAPSULE, DELAYED RELEASE ORAL DAILY
Qty: 90 CAPSULE | Refills: 3 | Status: SHIPPED | OUTPATIENT
Start: 2023-02-14 | End: 2023-05-17 | Stop reason: SDUPTHER

## 2023-02-14 NOTE — TELEPHONE ENCOUNTER
Received request via: Pharmacy    Was the patient seen in the last year in this department? Yes    Does the patient have an active prescription (recently filled or refills available) for medication(s) requested? No    Does the patient have FPC Plus and need 100 day supply (blood pressure, diabetes and cholesterol meds only)? Patient does not have SCP    Last ov 2/8/23

## 2023-04-29 ENCOUNTER — APPOINTMENT (OUTPATIENT)
Dept: PULMONOLOGY | Facility: MEDICAL CENTER | Age: 48
End: 2023-04-29
Attending: NURSE PRACTITIONER
Payer: COMMERCIAL

## 2023-05-17 ENCOUNTER — OFFICE VISIT (OUTPATIENT)
Dept: MEDICAL GROUP | Facility: PHYSICIAN GROUP | Age: 48
End: 2023-05-17
Payer: COMMERCIAL

## 2023-05-17 VITALS
WEIGHT: 189.2 LBS | BODY MASS INDEX: 28.02 KG/M2 | RESPIRATION RATE: 16 BRPM | HEART RATE: 90 BPM | DIASTOLIC BLOOD PRESSURE: 80 MMHG | SYSTOLIC BLOOD PRESSURE: 118 MMHG | OXYGEN SATURATION: 95 % | HEIGHT: 69 IN | TEMPERATURE: 98 F

## 2023-05-17 DIAGNOSIS — R10.13 EPIGASTRIC PAIN: ICD-10-CM

## 2023-05-17 DIAGNOSIS — Z12.11 SCREENING FOR COLORECTAL CANCER: ICD-10-CM

## 2023-05-17 DIAGNOSIS — Z12.12 SCREENING FOR COLORECTAL CANCER: ICD-10-CM

## 2023-05-17 DIAGNOSIS — E13.9 DIABETES 1.5, MANAGED AS TYPE 2 (HCC): ICD-10-CM

## 2023-05-17 DIAGNOSIS — J45.40 MODERATE PERSISTENT REACTIVE AIRWAY DISEASE WITHOUT COMPLICATION: ICD-10-CM

## 2023-05-17 LAB
HBA1C MFR BLD: 6.1 % (ref ?–5.8)
POCT INT CON NEG: NEGATIVE
POCT INT CON POS: POSITIVE

## 2023-05-17 PROCEDURE — 3079F DIAST BP 80-89 MM HG: CPT | Performed by: NURSE PRACTITIONER

## 2023-05-17 PROCEDURE — 99214 OFFICE O/P EST MOD 30 MIN: CPT | Performed by: NURSE PRACTITIONER

## 2023-05-17 PROCEDURE — 3074F SYST BP LT 130 MM HG: CPT | Performed by: NURSE PRACTITIONER

## 2023-05-17 PROCEDURE — 83036 HEMOGLOBIN GLYCOSYLATED A1C: CPT | Performed by: NURSE PRACTITIONER

## 2023-05-17 PROCEDURE — 92250 FUNDUS PHOTOGRAPHY W/I&R: CPT | Mod: TC | Performed by: NURSE PRACTITIONER

## 2023-05-17 RX ORDER — ALBUTEROL SULFATE 90 UG/1
2 AEROSOL, METERED RESPIRATORY (INHALATION) EVERY 6 HOURS PRN
Qty: 25 G | Refills: 0 | Status: SHIPPED | OUTPATIENT
Start: 2023-05-17

## 2023-05-17 RX ORDER — FLUTICASONE PROPIONATE 50 MCG
1 SPRAY, SUSPENSION (ML) NASAL DAILY
Qty: 16 G | Refills: 1 | Status: SHIPPED | OUTPATIENT
Start: 2023-05-17 | End: 2023-10-31

## 2023-05-17 RX ORDER — OMEPRAZOLE 20 MG/1
20 CAPSULE, DELAYED RELEASE ORAL DAILY
Qty: 90 CAPSULE | Refills: 3 | Status: SHIPPED | OUTPATIENT
Start: 2023-05-17 | End: 2023-10-31

## 2023-05-17 ASSESSMENT — FIBROSIS 4 INDEX: FIB4 SCORE: 0.53

## 2023-05-17 NOTE — ASSESSMENT & PLAN NOTE
This is a chronic health problem that is well controlled with current medications and lifestyle measures.  Started using Pulmicort twice daily.  Reports it has helped a great deal.  Now only needing albuterol once every couple weeks.  Needing refill of inhalers.

## 2023-05-17 NOTE — PROGRESS NOTES
CC: Follow-up    HISTORY OF THE PRESENT ILLNESS: Patient is a 47 y.o. male. This pleasant patient is here today for evaluation and management of the following health problems.    Health Maintenance: due      Diabetes 1.5, managed as type 2 (HCC)  Chronic health problem.  Discontinued metformin approximately 8 weeks ago.  Point-of-care A1c is 6.1%.  Metformin causes severe fatigue and lightheadedness.  Does not want to try an injectable, fear of needles.  Has been gaining some weight.  Denies polydipsia, polyuria, vision changes.      Reactive airway disease  This is a chronic health problem that is well controlled with current medications and lifestyle measures.  Started using Pulmicort twice daily.  Reports it has helped a great deal.  Now only needing albuterol once every couple weeks.  Needing refill of inhalers.    Epigastric pain  This is a chronic health problem that is well controlled with current medications and lifestyle measures.  Taking omeprazole 20 mg daily.  Has not scheduled yet with GI, previously referred.    Allergies: Bee venom    Current Outpatient Medications Ordered in Epic   Medication Sig Dispense Refill    Semaglutide 3 MG Tab Take 3 mg by mouth every day. 90 Tablet 1    budesonide (PULMICORT) 90 MCG/ACT AEROSOL POWDER, BREATH ACTIVATED Inhale 2 Puffs 2 times a day. 3 Each 1    albuterol 108 (90 Base) MCG/ACT Aero Soln inhalation aerosol Inhale 2 Puffs every 6 hours as needed for Shortness of Breath. 25 g 0    omeprazole (PRILOSEC) 20 MG delayed-release capsule Take 1 Capsule by mouth every day. 90 Capsule 3    fluticasone (FLONASE) 50 MCG/ACT nasal spray Administer 1 Spray into affected nostril(S) every day. 16 g 1     No current [facility]-ordered facility-administered medications on file.       Past Medical History:   Diagnosis Date    Anxiety 11/23/2016    Controlled substance agreement broken 12/20/2016    Hangnail 12/20/2016    Lumbar back pain 6/10/2015    Reactive airway disease 2/8/2023  "      History reviewed. No pertinent surgical history.    Social History     Tobacco Use    Smoking status: Former     Packs/day: 0.25     Years: 15.00     Pack years: 3.75     Types: Cigars, Cigarettes     Quit date: 2/25/2013     Years since quitting: 10.2    Smokeless tobacco: Never   Vaping Use    Vaping Use: Never used   Substance Use Topics    Alcohol use: Yes     Alcohol/week: 1.2 oz     Types: 2 Standard drinks or equivalent per week    Drug use: No       Family History   Adopted: Yes   Family history unknown: Yes       ROS: As in HPI, otherwise negative for chest pain, dyspnea, abdominal pain, dysuria, blood in stool, fever          Exam: /80   Pulse 90   Temp 36.7 °C (98 °F) (Temporal)   Resp 16   Ht 1.753 m (5' 9\")   Wt 85.8 kg (189 lb 3.2 oz)   SpO2 95%  Body mass index is 27.94 kg/m².    General: Alert, pleasant, well nourished, well developed male in NAD  HEENT: Normocephalic. Eyes conjunctiva clear lids without ptosis, pupils equal and reactive to light, ears normal shape and contour, canals are clear bilaterally, tympanic membranes are pearly gray with good light reflex, nasal mucosa pink and boggy, oropharynx is without erythema, edema or exudates.   Neck: Supple without bruit. Thyroid is not enlarged.  Pulmonary: Clear to ausculation.  Normal effort. No rales, ronchi, or wheezing.  Cardiovascular: Normal rate and rhythm without murmur.   Neurologic: Grossly nonfocal  Lymph: No cervical or supraclavicular lymph nodes are palpable  Skin: Warm and dry.    Musculoskeletal: Normal gait.   Psych: Normal mood and affect. Alert and oriented. Judgment and insight is normal.    Please note that this dictation was created using voice recognition software. I have made every reasonable attempt to correct obvious errors, but I expect that there are errors of grammar and possibly content that I did not discover before finalizing the note.      Assessment/Plan  1. Diabetes 1.5, managed as type 2 " (HCC)  Controlled.  Adverse reaction to metformin.  We will try Rybelsus.  Instructions and side effects reviewed with patient.  Continue with diabetic diet.  Work on routine aerobic exercise.  - POCT  A1C  - Semaglutide 3 MG Tab; Take 3 mg by mouth every day.  Dispense: 90 Tablet; Refill: 1  - POCT Retinal Eye Exam    2. Moderate persistent reactive airway disease without complication  Well-controlled.  Continue with Pulmicort routinely and albuterol as needed.  - budesonide (PULMICORT) 90 MCG/ACT AEROSOL POWDER, BREATH ACTIVATED; Inhale 2 Puffs 2 times a day.  Dispense: 3 Each; Refill: 1  - albuterol 108 (90 Base) MCG/ACT Aero Soln inhalation aerosol; Inhale 2 Puffs every 6 hours as needed for Shortness of Breath.  Dispense: 25 g; Refill: 0    3. Epigastric pain  Well-controlled.  Continue with omeprazole.  Patient will schedule appointment with GI.  - omeprazole (PRILOSEC) 20 MG delayed-release capsule; Take 1 Capsule by mouth every day.  Dispense: 90 Capsule; Refill: 3    4. Screening for colorectal cancer  Rationale reviewed with patient.  - Referral to GI for Colonoscopy      Patient will return to clinic in 3 months or sooner if needed.

## 2023-05-17 NOTE — ASSESSMENT & PLAN NOTE
Chronic health problem.  Discontinued metformin approximately 8 weeks ago.  Point-of-care A1c is 6.1%.  Metformin causes severe fatigue and lightheadedness.  Does not want to try an injectable, fear of needles.  Has been gaining some weight.  Denies polydipsia, polyuria, vision changes.

## 2023-05-17 NOTE — ASSESSMENT & PLAN NOTE
This is a chronic health problem that is well controlled with current medications and lifestyle measures.  Taking omeprazole 20 mg daily.  Has not scheduled yet with GI, previously referred.

## 2023-05-25 LAB — RETINAL SCREEN: NEGATIVE

## 2023-10-31 ENCOUNTER — OFFICE VISIT (OUTPATIENT)
Dept: URGENT CARE | Facility: PHYSICIAN GROUP | Age: 48
End: 2023-10-31
Payer: COMMERCIAL

## 2023-10-31 VITALS
SYSTOLIC BLOOD PRESSURE: 112 MMHG | OXYGEN SATURATION: 94 % | WEIGHT: 197 LBS | HEIGHT: 69 IN | TEMPERATURE: 98.7 F | RESPIRATION RATE: 16 BRPM | BODY MASS INDEX: 29.18 KG/M2 | HEART RATE: 73 BPM | DIASTOLIC BLOOD PRESSURE: 68 MMHG

## 2023-10-31 DIAGNOSIS — B96.89 ACUTE BACTERIAL SINUSITIS: ICD-10-CM

## 2023-10-31 DIAGNOSIS — J01.90 ACUTE BACTERIAL SINUSITIS: ICD-10-CM

## 2023-10-31 PROCEDURE — 3074F SYST BP LT 130 MM HG: CPT | Performed by: NURSE PRACTITIONER

## 2023-10-31 PROCEDURE — 3078F DIAST BP <80 MM HG: CPT | Performed by: NURSE PRACTITIONER

## 2023-10-31 PROCEDURE — 99213 OFFICE O/P EST LOW 20 MIN: CPT | Performed by: NURSE PRACTITIONER

## 2023-10-31 RX ORDER — AMOXICILLIN AND CLAVULANATE POTASSIUM 875; 125 MG/1; MG/1
1 TABLET, FILM COATED ORAL 2 TIMES DAILY
Qty: 20 TABLET | Refills: 0 | Status: SHIPPED | OUTPATIENT
Start: 2023-10-31 | End: 2023-11-10

## 2023-10-31 RX ORDER — METHYLPREDNISOLONE 4 MG/1
TABLET ORAL
Qty: 21 TABLET | Refills: 0 | Status: SHIPPED | OUTPATIENT
Start: 2023-10-31

## 2023-10-31 ASSESSMENT — FIBROSIS 4 INDEX: FIB4 SCORE: 0.53

## 2023-10-31 NOTE — PROGRESS NOTES
"Subjective:   Skip Bernard is a 47 y.o. male who presents for Congestion (Moving into chest, sinus pressure and ear pain, scalp feels tender, right side of face is painful)    Patient is a 47-year-old male presenting clinic today with 3-week history of worsening sinus pain, pressure, nasal congestion, intermittent hot and cold flashes, facial pain, bilateral ear pain and pressure, achiness in his scalp, and mild cough due to postnasal drip.  He denies any known fever, wheezing, shortness of breath, palpitations  Patient does have long history of asthma, frequent bronchitis, and has had pneumonia in the past.  Last episode of pneumonia was in March 2021.  He has been taking over-the-counter ibuprofen and Benadryl with minimal symptom relief.    Medications, Allergies, and current problem list reviewed today in Epic.     Objective:     BP (!) 120/90   Pulse 73   Temp 37.1 °C (98.7 °F) (Temporal)   Resp 16   Ht 1.753 m (5' 9\")   Wt 89.4 kg (197 lb)   SpO2 94%     Physical Exam  Vitals reviewed.   Constitutional:       General: He is not in acute distress.     Appearance: Normal appearance. He is ill-appearing. He is not toxic-appearing.   HENT:      Head: Normocephalic.      Right Ear: Tympanic membrane, ear canal and external ear normal.      Left Ear: Tympanic membrane, ear canal and external ear normal.      Nose: Mucosal edema, congestion and rhinorrhea present. Rhinorrhea is clear and purulent.      Right Turbinates: Swollen.      Left Turbinates: Swollen.      Right Sinus: Maxillary sinus tenderness and frontal sinus tenderness present.      Left Sinus: Maxillary sinus tenderness and frontal sinus tenderness present.      Mouth/Throat:      Lips: Pink.      Mouth: Mucous membranes are moist.      Pharynx: Uvula midline. Posterior oropharyngeal erythema present. No oropharyngeal exudate or uvula swelling.   Eyes:      Extraocular Movements: Extraocular movements intact.      Conjunctiva/sclera: " Conjunctivae normal.      Pupils: Pupils are equal, round, and reactive to light.   Cardiovascular:      Rate and Rhythm: Normal rate and regular rhythm.      Heart sounds: No murmur heard.  Pulmonary:      Effort: Pulmonary effort is normal.      Breath sounds: Normal breath sounds. No wheezing, rhonchi or rales.   Musculoskeletal:         General: Normal range of motion.      Cervical back: Normal range of motion and neck supple.   Lymphadenopathy:      Cervical: No cervical adenopathy.   Skin:     General: Skin is warm and dry.   Neurological:      General: No focal deficit present.      Mental Status: He is alert and oriented to person, place, and time.   Psychiatric:         Mood and Affect: Mood normal.         Behavior: Behavior normal.         Assessment/Plan:     Diagnosis and associated orders:     1. Acute bacterial sinusitis  amoxicillin-clavulanate (AUGMENTIN) 875-125 MG Tab    methylPREDNISolone (MEDROL DOSEPAK) 4 MG Tablet Therapy Pack         Comments/MDM:     Provided patient with information on the etiology & pathogenesis of bacterial sinusitis.   Recommend cool mist humidifier at home, use nasal saline wash (i.e. Nedi-Pot), Flonase, Astelin nasal spray, may take OTC decongestant prn, and antibiotics as prescribed.   Tylenol/Motrin prn HA or discomfort.   Return to clinic for fever >4d, no improvement within 48-72h, or for any other questions or concerns.            Differential diagnosis, natural history, supportive care, and indications for immediate follow-up discussed.    Advised the patient to follow-up with the primary care physician for recheck, reevaluation, and consideration of further management.    I personally reviewed prior external notes and test results pertinent to today's visit as well as additional imaging and testing completed in clinic today.     Please note that this dictation was created using voice recognition software. I have made a reasonable attempt to correct obvious  errors, but I expect that there are errors of grammar and possibly content that I did not discover before finalizing the note.

## 2024-05-03 ENCOUNTER — OFFICE VISIT (OUTPATIENT)
Dept: URGENT CARE | Facility: PHYSICIAN GROUP | Age: 49
End: 2024-05-03
Payer: COMMERCIAL

## 2024-05-03 VITALS
TEMPERATURE: 98.8 F | BODY MASS INDEX: 27.85 KG/M2 | SYSTOLIC BLOOD PRESSURE: 118 MMHG | DIASTOLIC BLOOD PRESSURE: 72 MMHG | RESPIRATION RATE: 14 BRPM | OXYGEN SATURATION: 97 % | HEART RATE: 89 BPM | WEIGHT: 188 LBS | HEIGHT: 69 IN

## 2024-05-03 DIAGNOSIS — F41.9 ANXIETY: ICD-10-CM

## 2024-05-03 PROCEDURE — 99214 OFFICE O/P EST MOD 30 MIN: CPT | Performed by: NURSE PRACTITIONER

## 2024-05-03 PROCEDURE — 3078F DIAST BP <80 MM HG: CPT | Performed by: NURSE PRACTITIONER

## 2024-05-03 PROCEDURE — 3074F SYST BP LT 130 MM HG: CPT | Performed by: NURSE PRACTITIONER

## 2024-05-03 RX ORDER — VENLAFAXINE 37.5 MG/1
37.5 TABLET ORAL 3 TIMES DAILY
COMMUNITY
End: 2024-05-03

## 2024-05-03 RX ORDER — VENLAFAXINE 37.5 MG/1
37.5 TABLET ORAL 2 TIMES DAILY
Qty: 60 TABLET | Refills: 0 | Status: SHIPPED | OUTPATIENT
Start: 2024-05-03 | End: 2024-06-02

## 2024-05-03 ASSESSMENT — ENCOUNTER SYMPTOMS
MEMORY LOSS: 0
NERVOUS/ANXIOUS: 1
INSOMNIA: 1
HALLUCINATIONS: 0
DEPRESSION: 0

## 2024-05-03 ASSESSMENT — LIFESTYLE VARIABLES: SUBSTANCE_ABUSE: 0

## 2024-05-03 ASSESSMENT — FIBROSIS 4 INDEX: FIB4 SCORE: 0.54

## 2024-05-03 NOTE — PROGRESS NOTES
Subjective:     Skip Bernard is a 48 y.o. male who presents for Medication Refill (Medication refill on venlafaxine, )      HPI  Pt presents for evaluation of a new problem.  Skip is a pleasant 48-year-old male who presents to urgent care today with the need for a medication refill on Effexor.  He states that he was originally started on this medication in 2017 and has been intermittently using it as needed.  Patient states that he has been recently suffering from stress at work and in his home life and is recently started to take Effexor 1-2 times daily as needed for relief of anxiety.  He denies any symptoms of depression or thoughts of self-harm.  He notes that this medication has been working very well for him to reduce his anxiety.  Patient notes that he is also tried hydroxyzine in the past which only made him drowsy.  He does use this for sleep at night as needed.    Review of Systems   Psychiatric/Behavioral:  Negative for depression, hallucinations, memory loss, substance abuse and suicidal ideas. The patient is nervous/anxious and has insomnia.        PMH:   Past Medical History:   Diagnosis Date    Anxiety 2016    Controlled substance agreement broken 2016    Hangnail 2016    Lumbar back pain 6/10/2015    Reactive airway disease 2023     ALLERGIES:   Allergies   Allergen Reactions    Bee Venom      SURGHX: No past surgical history on file.  SOCHX:   Social History     Socioeconomic History    Marital status: Unknown   Tobacco Use    Smoking status: Former     Current packs/day: 0.00     Average packs/day: 0.3 packs/day for 15.0 years (3.8 ttl pk-yrs)     Types: Cigars, Cigarettes     Start date: 1998     Quit date: 2013     Years since quittin.1    Smokeless tobacco: Never   Vaping Use    Vaping Use: Never used   Substance and Sexual Activity    Alcohol use: Yes     Alcohol/week: 1.2 oz     Types: 2 Standard drinks or equivalent per week    Drug use: No     "Sexual activity: Yes     Partners: Female     FH:   Family History   Adopted: Yes   Family history unknown: Yes         Objective:   /72   Pulse 89   Temp 37.1 °C (98.8 °F) (Temporal)   Resp 14   Ht 1.753 m (5' 9\")   Wt 85.3 kg (188 lb)   SpO2 97%   BMI 27.76 kg/m²     Physical Exam  Vitals and nursing note reviewed.   Constitutional:       General: He is not in acute distress.     Appearance: Normal appearance. He is normal weight. He is not ill-appearing or toxic-appearing.   HENT:      Head: Normocephalic.      Right Ear: External ear normal.      Left Ear: External ear normal.      Nose: Nose normal.      Mouth/Throat:      Mouth: Mucous membranes are moist.   Eyes:      General:         Right eye: No discharge.         Left eye: No discharge.      Extraocular Movements: Extraocular movements intact.      Conjunctiva/sclera: Conjunctivae normal.      Pupils: Pupils are equal, round, and reactive to light.   Pulmonary:      Effort: Pulmonary effort is normal.      Breath sounds: Normal breath sounds.   Abdominal:      General: Abdomen is flat.   Musculoskeletal:         General: Normal range of motion.      Cervical back: Normal range of motion and neck supple. No rigidity.   Lymphadenopathy:      Cervical: No cervical adenopathy.   Skin:     General: Skin is warm and dry.   Neurological:      General: No focal deficit present.      Mental Status: He is alert and oriented to person, place, and time. Mental status is at baseline.   Psychiatric:         Mood and Affect: Mood normal.         Behavior: Behavior normal.         Judgment: Judgment normal.         Assessment/Plan:   Assessment    1. Anxiety  venlafaxine (EFFEXOR) 37.5 MG Tab    Referral back to PCP    Referral to Behavioral Health        Patient was prescribed 30-day dose of venlafaxine for treatment of anxiety.  He was referred to follow back up with PCP as well as with behavioral health for further medication refills.  He is in agreement " with plan of care today.

## 2024-05-30 DIAGNOSIS — F41.9 ANXIETY: ICD-10-CM

## 2024-06-03 ENCOUNTER — OFFICE VISIT (OUTPATIENT)
Dept: URGENT CARE | Facility: PHYSICIAN GROUP | Age: 49
End: 2024-06-03
Payer: COMMERCIAL

## 2024-06-03 VITALS
RESPIRATION RATE: 14 BRPM | HEIGHT: 69 IN | OXYGEN SATURATION: 98 % | BODY MASS INDEX: 26.07 KG/M2 | DIASTOLIC BLOOD PRESSURE: 70 MMHG | SYSTOLIC BLOOD PRESSURE: 128 MMHG | WEIGHT: 176 LBS | HEART RATE: 66 BPM | TEMPERATURE: 99.8 F
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
## 2024-06-03 DIAGNOSIS — S89.91XA: ICD-10-CM
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
## 2024-06-03 DIAGNOSIS — S40.011A CONTUSION OF MULTIPLE SITES OF SHOULDER AND UPPER ARM, RIGHT, INITIAL ENCOUNTER: ICD-10-CM

## 2024-06-03 DIAGNOSIS — S50.01XA CONTUSION OF RIGHT ELBOW, INITIAL ENCOUNTER: ICD-10-CM

## 2024-06-03 DIAGNOSIS — S40.021A CONTUSION OF MULTIPLE SITES OF SHOULDER AND UPPER ARM, RIGHT, INITIAL ENCOUNTER: ICD-10-CM

## 2024-06-03 PROCEDURE — 3074F SYST BP LT 130 MM HG: CPT | Performed by: PHYSICIAN ASSISTANT

## 2024-06-03 PROCEDURE — 3078F DIAST BP <80 MM HG: CPT | Performed by: PHYSICIAN ASSISTANT

## 2024-06-03 PROCEDURE — 99213 OFFICE O/P EST LOW 20 MIN: CPT | Performed by: PHYSICIAN ASSISTANT

## 2024-06-03 RX ORDER — VENLAFAXINE 37.5 MG/1
37.5 TABLET ORAL 3 TIMES DAILY
COMMUNITY

## 2024-06-03 RX ORDER — MELOXICAM 15 MG/1
15 TABLET ORAL DAILY
Qty: 30 TABLET | Refills: 0 | Status: SHIPPED | OUTPATIENT
Start: 2024-06-03

## 2024-06-03 RX ORDER — CYCLOBENZAPRINE HCL 10 MG
10 TABLET ORAL 3 TIMES DAILY PRN
Qty: 30 TABLET | Refills: 0 | Status: SHIPPED | OUTPATIENT
Start: 2024-06-03

## 2024-06-03 ASSESSMENT — FIBROSIS 4 INDEX: FIB4 SCORE: 0.54

## 2024-06-03 ASSESSMENT — ENCOUNTER SYMPTOMS
RESPIRATORY NEGATIVE: 1
NUMBNESS: 0
NECK PAIN: 0
ABDOMINAL PAIN: 0
MYALGIAS: 1
VOMITING: 0
ARTHRALGIAS: 1
JOINT SWELLING: 0
LOSS OF CONSCIOUSNESS: 0
HEADACHES: 0
NAUSEA: 0
CARDIOVASCULAR NEGATIVE: 1
NEUROLOGICAL NEGATIVE: 1
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
## 2024-06-03 NOTE — LETTER
Shruthi 3, 2024         Patient: Skip Bernard   YOB: 1975   Date of Visit: 6/3/2024           To Whom it May Concern:    Skip Bernard was seen in my clinic on 6/3/2024. He may return to work on 06/06/2024.    If you have any questions or concerns, please don't hesitate to call.        Sincerely,           Rea Lopez P.A.-C.  Electronically Signed

## 2024-08-28 RX ORDER — VENLAFAXINE 37.5 MG/1
37.5 TABLET ORAL 2 TIMES DAILY
Qty: 60 TABLET | Refills: 0 | OUTPATIENT
Start: 2024-08-28

## 2024-10-22 ENCOUNTER — HOSPITAL ENCOUNTER (OUTPATIENT)
Dept: LAB | Facility: MEDICAL CENTER | Age: 49
End: 2024-10-22
Payer: COMMERCIAL

## 2024-10-22 LAB
ALBUMIN SERPL BCP-MCNC: 4.5 G/DL (ref 3.2–4.9)
ALBUMIN/GLOB SERPL: 1.6 G/DL
ALP SERPL-CCNC: 88 U/L (ref 30–99)
ALT SERPL-CCNC: 23 U/L (ref 2–50)
ANION GAP SERPL CALC-SCNC: 11 MMOL/L (ref 7–16)
AST SERPL-CCNC: 21 U/L (ref 12–45)
BILIRUB SERPL-MCNC: 0.3 MG/DL (ref 0.1–1.5)
BUN SERPL-MCNC: 14 MG/DL (ref 8–22)
CALCIUM ALBUM COR SERPL-MCNC: 9.1 MG/DL (ref 8.5–10.5)
CALCIUM SERPL-MCNC: 9.5 MG/DL (ref 8.5–10.5)
CHLORIDE SERPL-SCNC: 100 MMOL/L (ref 96–112)
CHOLEST SERPL-MCNC: 226 MG/DL (ref 100–199)
CO2 SERPL-SCNC: 27 MMOL/L (ref 20–33)
CREAT SERPL-MCNC: 0.76 MG/DL (ref 0.5–1.4)
EST. AVERAGE GLUCOSE BLD GHB EST-MCNC: 128 MG/DL
FASTING STATUS PATIENT QL REPORTED: NORMAL
GFR SERPLBLD CREATININE-BSD FMLA CKD-EPI: 112 ML/MIN/1.73 M 2
GLOBULIN SER CALC-MCNC: 2.9 G/DL (ref 1.9–3.5)
GLUCOSE SERPL-MCNC: 98 MG/DL (ref 65–99)
HBA1C MFR BLD: 6.1 % (ref 4–5.6)
HDLC SERPL-MCNC: 54 MG/DL
LDLC SERPL CALC-MCNC: 160 MG/DL
POTASSIUM SERPL-SCNC: 4.6 MMOL/L (ref 3.6–5.5)
PROT SERPL-MCNC: 7.4 G/DL (ref 6–8.2)
SODIUM SERPL-SCNC: 138 MMOL/L (ref 135–145)
TRIGL SERPL-MCNC: 61 MG/DL (ref 0–149)

## 2024-10-22 PROCEDURE — 80053 COMPREHEN METABOLIC PANEL: CPT

## 2024-10-22 PROCEDURE — 86695 HERPES SIMPLEX TYPE 1 TEST: CPT

## 2024-10-22 PROCEDURE — 86696 HERPES SIMPLEX TYPE 2 TEST: CPT

## 2024-10-22 PROCEDURE — 86694 HERPES SIMPLEX NES ANTBDY: CPT

## 2024-10-22 PROCEDURE — 36415 COLL VENOUS BLD VENIPUNCTURE: CPT

## 2024-10-22 PROCEDURE — 83036 HEMOGLOBIN GLYCOSYLATED A1C: CPT

## 2024-10-22 PROCEDURE — 80061 LIPID PANEL: CPT

## 2024-10-25 LAB
HSV1 GG IGG SER-ACNC: 42.4 IV
HSV1+2 IGG SER IA-ACNC: >22.4 IV
HSV2 GG IGG SER-ACNC: 0.04 IV

## 2024-10-28 NOTE — PROGRESS NOTES
Chief Complaint   Patient presents with   • Cough   • Shortness of Breath       HISTORY OF PRESENT ILLNESS: Patient is a 44 y.o. male who presents today because he has been battling respiratory illness for the last month.  He has been on antibiotics and steroids.  He thought he was getting better up until last week.  Incidentally he just returned from [place] about a week or so prior to his symptom onset.  He has shortness of breath, cough, phlegm production.  No fevers, although he has not taken his temperature    Patient Active Problem List    Diagnosis Date Noted   • Rash and nonspecific skin eruption 04/13/2018   • Anxiety 11/23/2016   • Lumbar back pain 06/10/2015       Allergies:Bee venom    Current Outpatient Medications Ordered in Epic   Medication Sig Dispense Refill   • amoxicillin-clavulanate (AUGMENTIN) 875-125 MG Tab Take 1 Tab by mouth 2 times a day for 10 days. 20 Tab 0   • azithromycin (ZITHROMAX) 250 MG Tab Follow package directions 6 Tab 0   • predniSONE (DELTASONE) 10 MG Tab 4 PO QD X 4 day, 3 PO QD x 3 days, 2 PO QD x 2 days, 1 PO QD x 1 day 30 Tab 0   • albuterol 108 (90 Base) MCG/ACT Aero Soln inhalation aerosol Inhale 2 Puffs by mouth every four hours as needed. 1 Inhaler 0   • Spacer/Aero-Holding Chambers (VALVED HOLDING CHAMBER) Device USE WITH INHALER     • predniSONE (DELTASONE) 10 MG Tab 50 mg x 1 day; 40 mg x 1 day; 30 mg x 1 day; 20 mg x 1 day; 10 mg x 1 day 1 Quantity Sufficient 0   • doxycycline monohydrate (ADOXA) 100 MG tablet Take 1 Tab by mouth 2 times a day. 14 Tab 0   • albuterol 108 (90 Base) MCG/ACT Aero Soln inhalation aerosol Inhale 2 Puffs by mouth every four hours as needed. 1 Inhaler 0   • hydrOXYzine HCl (ATARAX) 25 MG Tab Take 1 Tab by mouth 3 times a day as needed. 30 Tab 0     No current Epic-ordered facility-administered medications on file.        Past Medical History:   Diagnosis Date   • Anxiety 11/23/2016   • Controlled substance agreement broken 12/20/2016   •  Hangnail 2016   • Lumbar back pain 6/10/2015       Social History     Tobacco Use   • Smoking status: Former Smoker     Packs/day: 0.25     Years: 15.00     Pack years: 3.75     Types: Cigarettes     Last attempt to quit: 2013     Years since quittin.0   • Smokeless tobacco: Never Used   Substance Use Topics   • Alcohol use: Yes     Comment: rare   • Drug use: No       No family status information on file.     Family History   Adopted: Yes       ROS:  Review of Systems   Constitutional: Negative for fever, positive for chills, no weight loss and malaise/fatigue.   HENT: Negative for ear pain, nosebleeds, congestion, sore throat and neck pain.    Eyes: Negative for blurred vision.   Respiratory: Positive for cough, sputum production, shortness of breath and wheezing.    Cardiovascular: Negative for chest pain, palpitations, orthopnea and leg swelling.   Gastrointestinal: Negative for heartburn, nausea, vomiting and abdominal pain.   Genitourinary: Negative for dysuria, urgency and frequency.     Exam:  /80   Pulse 86   Temp 37 °C (98.6 °F)   Resp 20   SpO2 97%   General:  Well nourished, well developed male in NAD  Head:Normocephalic, atraumatic  Eyes: PERRLA, EOM within normal limits, no conjunctival injection, no scleral icterus, visual fields and acuity grossly intact.  Ears: Normal shape and symmetry, no tenderness, no discharge. External canals are without any significant edema or erythema. Tympanic membranes are without any inflammation, no effusion. Gross auditory acuity is intact  Nose: Symmetrical without tenderness, no discharge.  Mouth: reasonable hygiene, no erythema exudates or tonsillar enlargement.  Neck: no masses, range of motion within normal limits, no tracheal deviation. No obvious thyroid enlargement.  Pulmonary: chest is symmetrical with respiration, right lower lobe is diminished with inspiratory rales.  There are scattered wheezes and expiratory rhonchi bilaterally  cardiovascular: regular rate and rhythm without murmurs, rubs, or gallops.  Extremities: no clubbing, cyanosis, or edema.    Please note that this dictation was created using voice recognition software. I have made every reasonable attempt to correct obvious errors, but I expect that there are errors of grammar and possibly content that I did not discover before finalizing the note.    Assessment/Plan:  1. Pneumonia of right lower lobe due to infectious organism (HCC)  amoxicillin-clavulanate (AUGMENTIN) 875-125 MG Tab    azithromycin (ZITHROMAX) 250 MG Tab    predniSONE (DELTASONE) 10 MG Tab    albuterol 108 (90 Base) MCG/ACT Aero Soln inhalation aerosol   Patient given self-isolation handout and instructions.  ER precautions reiterated    Followup with primary care in the next 7-10 days if not significantly improving, return to the urgent care or go to the emergency room sooner for any worsening of symptoms.        Treatment Goal Explanation (Does Not Render In The Note): Stable for the purposes of categorizing medical decision making is defined by the specific treatment goals for an individual patient. A patient that is not at their treatment goal is not stable, even if the condition has not changed and there is no short- term threat to life or function. Treatment Goal Met?: no

## 2024-12-14 ENCOUNTER — OFFICE VISIT (OUTPATIENT)
Dept: URGENT CARE | Facility: PHYSICIAN GROUP | Age: 49
End: 2024-12-14
Payer: COMMERCIAL

## 2024-12-14 VITALS
RESPIRATION RATE: 14 BRPM | WEIGHT: 186 LBS | OXYGEN SATURATION: 97 % | SYSTOLIC BLOOD PRESSURE: 118 MMHG | DIASTOLIC BLOOD PRESSURE: 74 MMHG | HEIGHT: 69 IN | TEMPERATURE: 98.6 F | HEART RATE: 70 BPM | BODY MASS INDEX: 27.55 KG/M2

## 2024-12-14 DIAGNOSIS — R05.1 ACUTE COUGH: ICD-10-CM

## 2024-12-14 DIAGNOSIS — J01.90 ACUTE BACTERIAL SINUSITIS: ICD-10-CM

## 2024-12-14 DIAGNOSIS — B96.89 ACUTE BACTERIAL SINUSITIS: ICD-10-CM

## 2024-12-14 LAB
FLUAV RNA SPEC QL NAA+PROBE: NEGATIVE
FLUBV RNA SPEC QL NAA+PROBE: NEGATIVE
RSV RNA SPEC QL NAA+PROBE: NEGATIVE
SARS-COV-2 RNA RESP QL NAA+PROBE: NEGATIVE

## 2024-12-14 PROCEDURE — 0241U POCT CEPHEID COV-2, FLU A/B, RSV - PCR: CPT | Performed by: FAMILY MEDICINE

## 2024-12-14 PROCEDURE — 99213 OFFICE O/P EST LOW 20 MIN: CPT | Performed by: FAMILY MEDICINE

## 2024-12-14 PROCEDURE — 3078F DIAST BP <80 MM HG: CPT | Performed by: FAMILY MEDICINE

## 2024-12-14 PROCEDURE — 3074F SYST BP LT 130 MM HG: CPT | Performed by: FAMILY MEDICINE

## 2024-12-14 PROCEDURE — 1126F AMNT PAIN NOTED NONE PRSNT: CPT | Performed by: FAMILY MEDICINE

## 2024-12-14 RX ORDER — PREDNISONE 20 MG/1
20 TABLET ORAL DAILY
Qty: 4 TABLET | Refills: 0 | Status: SHIPPED | OUTPATIENT
Start: 2024-12-14 | End: 2024-12-18

## 2024-12-14 ASSESSMENT — FIBROSIS 4 INDEX: FIB4 SCORE: 0.59

## 2024-12-14 ASSESSMENT — PAIN SCALES - GENERAL: PAINLEVEL_OUTOF10: NO PAIN

## 2024-12-14 NOTE — PROGRESS NOTES
Chief Complaint:    Chief Complaint   Patient presents with    Sinusitis    Nasal Congestion    Pharyngitis     Symptoms x2 weeks       History of Present Illness:    Symptoms started 2 weeks ago with sinus pain, still having nasal symptoms, sore throat, and cough, overall symptoms worsening. No definite fever. Augmentin and Prednisone have worked and been tolerated in the past.      Past Medical History:    Past Medical History:   Diagnosis Date    Anxiety 2016    Controlled substance agreement broken 2016    Hangnail 2016    Lumbar back pain 6/10/2015    Reactive airway disease 2023     Past Surgical History:    Past Surgical History:   Procedure Laterality Date    VASECTOMY  2024     Social History:    Social History     Socioeconomic History    Marital status: Unknown     Spouse name: Not on file    Number of children: Not on file    Years of education: Not on file    Highest education level: Not on file   Occupational History    Not on file   Tobacco Use    Smoking status: Former     Current packs/day: 0.00     Average packs/day: 0.3 packs/day for 15.0 years (3.8 ttl pk-yrs)     Types: Cigars, Cigarettes     Start date: 1998     Quit date: 2013     Years since quittin.8    Smokeless tobacco: Never   Vaping Use    Vaping status: Never Used   Substance and Sexual Activity    Alcohol use: Not Currently    Drug use: No    Sexual activity: Yes     Partners: Female   Other Topics Concern    Not on file   Social History Narrative    Not on file     Social Drivers of Health     Financial Resource Strain: Not on file   Food Insecurity: Not on file   Transportation Needs: Not on file   Physical Activity: Not on file   Stress: Not on file   Social Connections: Not on file   Intimate Partner Violence: Not on file   Housing Stability: Not on file     Family History:    Family History   Adopted: Yes   Family history unknown: Yes     Medications:    Current Outpatient Medications on  "File Prior to Visit   Medication Sig Dispense Refill    venlafaxine (EFFEXOR) 37.5 MG Tab Take 37.5 mg by mouth 3 times a day.      cyclobenzaprine (FLEXERIL) 10 mg Tab Take 1 Tablet by mouth 3 times a day as needed for Muscle Spasms. 30 Tablet 0    meloxicam (MOBIC) 15 MG tablet Take 1 Tablet by mouth every day. 30 Tablet 0     No current facility-administered medications on file prior to visit.     Allergies:    Allergies   Allergen Reactions    Bee Venom     Trazodone      Causes ED       Vitals:    Vitals:    24 1054   BP: 118/74   Pulse: 70   Resp: 14   Temp: 37 °C (98.6 °F)   TempSrc: Temporal   SpO2: 97%   Weight: 84.4 kg (186 lb)   Height: 1.753 m (5' 9\")       Physical Exam:    Constitutional: Vital signs reviewed. Appears well-developed and well-nourished. No acute distress.   Eyes: Sclera white, conjunctivae clear.  ENT: TTP bilateral sinus regions. External ears normal. External auditory canals normal without discharge. TMs translucent and non-bulging. Hearing normal. Lips/teeth are normal. Oral mucosa pink and moist. Posterior pharynx: WNL.  Neck: Neck supple.   Cardiovascular: Regular rate and rhythm. No murmur.  Pulmonary/Chest: Respirations non-labored. Clear to auscultation bilaterally.  Musculoskeletal: Normal gait. No muscular atrophy or weakness.  Neurological: Alert and oriented to person, place, and time. Muscle tone normal. Coordination normal.   Skin: No rashes or lesions. Warm, dry, normal turgor.  Psychiatric: Normal mood and affect. Behavior is normal. Judgment and thought content normal.       Diagnostics:    [facility] PCR test for Covid, Flu, and RSV is negative.      Assessment / Plan & Medical Decision Makin. Acute bacterial sinusitis  - amoxicillin-clavulanate (AUGMENTIN) 875-125 MG Tab; 1 tab by mouth twice a a day x 10 days. Take with food.  Dispense: 20 Tablet; Refill: 0  - predniSONE (DELTASONE) 20 MG Tab; Take 1 Tablet by mouth every day for 4 days.  Dispense: 4 " Tablet; Refill: 0    2. Acute cough  - POCT Cepheid CoV-2, Flu A/B, RSV - PCR       Discussed with him DDX, management options, and risks, benefits, and alternatives to treatment plan agreed upon.    Symptoms started 2 weeks ago with sinus pain, still having nasal symptoms, sore throat, and cough, overall symptoms worsening. No definite fever. Augmentin and Prednisone have worked and been tolerated in the past.    TTP bilateral sinus regions.     Cepheid PCR test for Covid, Flu, and RSV is negative.    Agreeable to medications prescribed.    Discussed expected course of duration, time for improvement, and to seek follow-up in Emergency Room, urgent care, or with PCP if getting worse at any time or not improving within expected time frame.

## 2024-12-28 ENCOUNTER — OFFICE VISIT (OUTPATIENT)
Dept: URGENT CARE | Facility: PHYSICIAN GROUP | Age: 49
End: 2024-12-28
Payer: COMMERCIAL

## 2024-12-28 VITALS
DIASTOLIC BLOOD PRESSURE: 78 MMHG | TEMPERATURE: 97.3 F | WEIGHT: 182 LBS | SYSTOLIC BLOOD PRESSURE: 110 MMHG | HEIGHT: 69 IN | RESPIRATION RATE: 18 BRPM | HEART RATE: 83 BPM | BODY MASS INDEX: 26.96 KG/M2 | OXYGEN SATURATION: 97 %

## 2024-12-28 DIAGNOSIS — J20.8 VIRAL BRONCHITIS: ICD-10-CM

## 2024-12-28 DIAGNOSIS — R68.89 FLU-LIKE SYMPTOMS: ICD-10-CM

## 2024-12-28 PROCEDURE — 3078F DIAST BP <80 MM HG: CPT | Performed by: NURSE PRACTITIONER

## 2024-12-28 PROCEDURE — 3074F SYST BP LT 130 MM HG: CPT | Performed by: NURSE PRACTITIONER

## 2024-12-28 PROCEDURE — 99213 OFFICE O/P EST LOW 20 MIN: CPT | Performed by: NURSE PRACTITIONER

## 2024-12-28 RX ORDER — GUAIFENESIN 600 MG/1
600 TABLET, EXTENDED RELEASE ORAL EVERY 12 HOURS
Qty: 28 TABLET | Refills: 0 | Status: SHIPPED | OUTPATIENT
Start: 2024-12-28 | End: 2025-01-11

## 2024-12-28 RX ORDER — DEXTROMETHORPHAN HYDROBROMIDE AND PROMETHAZINE HYDROCHLORIDE 15; 6.25 MG/5ML; MG/5ML
5 SYRUP ORAL EVERY 6 HOURS PRN
Qty: 100 ML | Refills: 0 | Status: SHIPPED | OUTPATIENT
Start: 2024-12-28

## 2024-12-28 RX ORDER — ALBUTEROL SULFATE 90 UG/1
2 INHALANT RESPIRATORY (INHALATION) EVERY 6 HOURS PRN
Qty: 8.5 G | Refills: 1 | Status: SHIPPED | OUTPATIENT
Start: 2024-12-28

## 2024-12-28 ASSESSMENT — FIBROSIS 4 INDEX: FIB4 SCORE: 0.59

## 2024-12-28 NOTE — PROGRESS NOTES
"Subjective:   Skip Bernard is a 49 y.o. male who presents for Fever and Body Aches (Was seen a couple weeks ago for the same thing)    This is  acute condition.  Patient reports 6 to 7-day history of fevers, chills, body aches, cough, nasal congestion, and fatigue.  Patient denies any chest pain, diarrhea, rashes.  He has taken over-the-counter Tylenol however has not tried any over-the-counter cold and flu medications.  No known sick contact.  Patient states that his symptoms have improved.  Patient is a chronic smoker.  Patient denies history of COPD or asthma.    Medications, Allergies, and current problem list reviewed today in Epic.     Objective:     /78   Pulse 83   Temp 36.3 °C (97.3 °F) (Temporal)   Resp 18   Ht 1.753 m (5' 9\")   Wt 82.6 kg (182 lb)   SpO2 97%     Physical Exam  Vitals reviewed.   Constitutional:       General: He is not in acute distress.     Appearance: Normal appearance. He is not ill-appearing or toxic-appearing.   HENT:      Head: Normocephalic.      Right Ear: Tympanic membrane, ear canal and external ear normal.      Left Ear: Tympanic membrane, ear canal and external ear normal.      Nose: Rhinorrhea present.      Mouth/Throat:      Mouth: Mucous membranes are moist.      Pharynx: No posterior oropharyngeal erythema.   Eyes:      Extraocular Movements: Extraocular movements intact.      Conjunctiva/sclera: Conjunctivae normal.      Pupils: Pupils are equal, round, and reactive to light.   Cardiovascular:      Rate and Rhythm: Normal rate and regular rhythm.   Pulmonary:      Effort: Pulmonary effort is normal. No respiratory distress.      Breath sounds: No stridor. Wheezing present. No rhonchi or rales.   Musculoskeletal:         General: Normal range of motion.      Cervical back: Normal range of motion and neck supple.   Lymphadenopathy:      Cervical: No cervical adenopathy.   Skin:     General: Skin is warm and dry.   Neurological:      General: No focal " deficit present.      Mental Status: He is alert and oriented to person, place, and time.   Psychiatric:         Mood and Affect: Mood normal.         Behavior: Behavior normal.         Assessment/Plan:     Diagnosis and associated orders:     1. Flu-like symptoms        2. Viral bronchitis  guaiFENesin ER (MUCINEX) 600 MG TABLET SR 12 HR    promethazine-dextromethorphan (PROMETHAZINE-DM) 6.25-15 MG/5ML syrup    albuterol 108 (90 Base) MCG/ACT Aero Soln inhalation aerosol         Comments/MDM:     This is an acute condition.  Patient is nontoxic-appearing and in no acute distress.  Patient is speaking full sentences without increased respiratory rate or effort.  Patient does have mild wheezing in bilateral lower lung fields, wheezing does clear with cough and deep inhalation.  Negative for rhonchi or rales.  No signs of pneumonia at this time.  No signs of secondary bacterial infection.  Vital signs are all reasonable.  Patient is a chronic smoker.  Due to duration of symptoms viral testing is not indicated at this time however did discuss with patient symptoms sound flulike most likely influenza A is there is a wide occurrence within our community.  Will go ahead and start on guaifenesin, promethazine, and albuterol.  Did discuss starting back on prednisone however patient is a type 1.5 diabetic, at this time we will go ahead and refrain from oral steroids.  Stressed patient the importance of pushing fluids and staying well-hydrated along with monitoring sugars closely.  Represent in clinic if symptoms continue in the next 5 to 7 days or symptoms acutely worsen.  ER precautions discussed  Patient was involved with shared decision-making throughout the exam today and verbalizes understanding regards to plan of care, discharge instructions, and follow-up         Differential diagnosis, natural history, supportive care, and indications for immediate follow-up discussed.    Advised the patient to follow-up with the  primary care physician for recheck, reevaluation, and consideration of further management.    I personally reviewed prior external notes and test results pertinent to today's visit as well as additional imaging and testing completed in clinic today.     Please note that this dictation was created using voice recognition software. I have made a reasonable attempt to correct obvious errors, but I expect that there are errors of grammar and possibly content that I did not discover before finalizing the note.

## 2025-02-14 DIAGNOSIS — J20.8 VIRAL BRONCHITIS: ICD-10-CM

## 2025-02-14 RX ORDER — ALBUTEROL SULFATE 90 UG/1
2 INHALANT RESPIRATORY (INHALATION) EVERY 6 HOURS PRN
Qty: 8.5 G | Refills: 1 | Status: SHIPPED | OUTPATIENT
Start: 2025-02-14